# Patient Record
Sex: FEMALE | Race: WHITE | NOT HISPANIC OR LATINO | Employment: OTHER | URBAN - METROPOLITAN AREA
[De-identification: names, ages, dates, MRNs, and addresses within clinical notes are randomized per-mention and may not be internally consistent; named-entity substitution may affect disease eponyms.]

---

## 2017-01-10 ENCOUNTER — OFFICE VISIT (OUTPATIENT)
Dept: NEUROLOGY | Facility: CLINIC | Age: 77
End: 2017-01-10
Payer: MEDICARE

## 2017-01-10 VITALS
HEART RATE: 74 BPM | HEIGHT: 63 IN | SYSTOLIC BLOOD PRESSURE: 154 MMHG | BODY MASS INDEX: 21.79 KG/M2 | DIASTOLIC BLOOD PRESSURE: 74 MMHG | WEIGHT: 123 LBS

## 2017-01-10 DIAGNOSIS — G20.A1 PARKINSON DISEASE: ICD-10-CM

## 2017-01-10 DIAGNOSIS — G31.84 MCI (MILD COGNITIVE IMPAIRMENT): Primary | ICD-10-CM

## 2017-01-10 PROCEDURE — 99213 OFFICE O/P EST LOW 20 MIN: CPT | Mod: PBBFAC | Performed by: PSYCHIATRY & NEUROLOGY

## 2017-01-10 PROCEDURE — 99213 OFFICE O/P EST LOW 20 MIN: CPT | Mod: S$PBB,GC,, | Performed by: PSYCHIATRY & NEUROLOGY

## 2017-01-10 PROCEDURE — 99999 PR PBB SHADOW E&M-EST. PATIENT-LVL III: CPT | Mod: PBBFAC,GC,, | Performed by: PSYCHIATRY & NEUROLOGY

## 2017-01-10 RX ORDER — CARBIDOPA, LEVODOPA AND ENTACAPONE 25; 200; 100 MG/1; MG/1; MG/1
1 TABLET, FILM COATED ORAL
COMMUNITY
End: 2017-09-12

## 2017-01-10 RX ORDER — OMEPRAZOLE 20 MG/1
CAPSULE, DELAYED RELEASE ORAL
COMMUNITY
Start: 2016-12-07 | End: 2017-01-10 | Stop reason: ALTCHOICE

## 2017-01-10 RX ORDER — RASAGILINE MESYLATE 1 MG/1
TABLET ORAL
COMMUNITY
Start: 2016-10-10 | End: 2017-09-12

## 2017-01-10 RX ORDER — CHOLECALCIFEROL (VITAMIN D3) 25 MCG
500 TABLET,CHEWABLE ORAL
COMMUNITY

## 2017-01-10 RX ORDER — MEMANTINE HYDROCHLORIDE 10 MG/1
10 TABLET ORAL 2 TIMES DAILY
COMMUNITY
End: 2020-01-06 | Stop reason: SDUPTHER

## 2017-01-10 RX ORDER — IPRATROPIUM BROMIDE 21 UG/1
2 SPRAY, METERED NASAL
COMMUNITY
Start: 2016-08-22 | End: 2017-08-22

## 2017-01-10 RX ORDER — CLONAZEPAM 0.5 MG/1
0.5 TABLET ORAL
COMMUNITY
End: 2019-04-23 | Stop reason: SDUPTHER

## 2017-01-10 NOTE — PROGRESS NOTES
Patient Name: Vanesa Rae  MRN: [id number]    Neurologist:  Lora Olea ([place])    CC: Parkinson evaluation for DBS      Interval history:  Overall reported improvement on Stalevo. Still complaining of nausea when woke up in the morning which resolve after taking morning dose of Stalevo at 9 am.  No other acute events reported.     HPI: Vanesa Rae is a 76 y.o. female presented to my clinic for the evaluation of DBS. In 2007 she started having tremors in her right hand. It was a resting tremor, she went to Dr Lambert (Neurologist) diagnosed with parkinson disease. Started on Mirapex, Nupro patch and other medication that she do not remember. Reported no significant improvement and changed his doctor 2008 (Dr Taylor). She started her on sinemet  and helped her tremors. He started getting nauseated with it and decreases the dose. In 2010 she was referred to [facility] for DBS evaluation and they said she is not a candidate for DBS. She stayed with Dr Taylor for years. She did fairly well till 2015. In 2015 tremors gradually getting worse despite on medication. Started having trouble in driving. In 2016 progressed and get worse and worse. Lately she is walking faster and get of balance but never had a fall. Also complaining of increase urinary frequency and urgency for last 5-6 years. Since you were started on klonopin and nemanda she become constipated and takes miralex. Her  says she screem at night and mumbles. Denies aggression. Sometime she shakes bad and sometime no shaking. She shakes for 10-15 minutes after taking sinemet and than goes away and this happened many times. She shakes more when anxious. She had orthostatic hypotension episodes in this summer. She was prescribed with fludrocortison but she didn't take it.  She reported low BP at home. that  this She is here for DBS evaluation.  Also reported weave, dance a couple times per week. Denies HA, slurring of speech, difficulty in  swallowing, acute vision changes, focal weakness, numbness, change in smell, seizure, visual or auditory hallucinations or LOC.        Home medication:   -- Sinemet  4 times a day. Every 4 hours in day but not every 4 hours at night.     -- Nemanda 10 mg TWICE DAILY  -- Azilect 1 mg daily   -- Klonopin 0.5 mg  HS     ROS:    Constitutional: Negative for malaise/fatigue. Negative for weight loss.   HENT: Negative for hearing loss.   Eyes: Negative for blurred vision and double vision.   Respiratory: Negative for shortness of breath and stridor.   Cardiovascular: Negative for chest pain and palpitations.   Gastrointestinal: Negative for nausea, vomiting and constipation.   Genitourinary: Negative for frequency. Negative for urgency.   Musculoskeletal: Negative for joint pain. Negative for myalgias and falls.   Skin: Negative for rash.   Neurological: Negative for dizziness and tremors. Negative for focal weakness and seizures.   Endo/Heme/Allergies: Does not bruise/bleed easily.   Psychiatric/Behavioral: Negative for memory loss. Negative for depression and hallucinations.    Past Medical History  History reviewed. No pertinent past medical history.    Medications    Current Outpatient Prescriptions:     AZILECT 1 mg Tab, , Disp: , Rfl:     carbidopa-levodopa-entacapone -200 mg (STALEVO 100) -200 mg Tab, Take 1 tablet by mouth., Disp: , Rfl:     carbidopa-levodopa-entacapone -200 mg (STALEVO) -200 mg Tab, Take 1 tablet by mouth 4 (four) times daily., Disp: 120 tablet, Rfl: 11    clonazePAM (KLONOPIN) 0.5 MG tablet, Take 0.5 mg by mouth., Disp: , Rfl:     cyanocobalamin, vitamin B-12, 2,500 mcg Tab, Take 500 mcg by mouth., Disp: , Rfl:     ipratropium (ATROVENT) 0.03 % nasal spray, 2 sprays by Nasal route., Disp: , Rfl:     memantine (NAMENDA) 10 MG Tab, Take 10 mg by mouth., Disp: , Rfl:     omeprazole (PRILOSEC) 20 MG capsule, TAKE 2 CAPSULES BY MOUTH DAILY., Disp: , Rfl:  "    Allergies  Review of patient's allergies indicates:  No Known Allergies    Social History  Social History     Social History    Marital status:      Spouse name: N/A    Number of children: N/A    Years of education: N/A     Occupational History    Not on file.     Social History Main Topics    Smoking status: Never Smoker    Smokeless tobacco: Not on file    Alcohol use No    Drug use: No    Sexual activity: Not on file     Other Topics Concern    Not on file     Social History Narrative    No narrative on file       Family History  History reviewed. No pertinent family history.    Physical Exam  Visit Vitals    BP (!) 154/74 (BP Location: Left arm, Patient Position: Sitting, BP Method: Automatic)    Pulse 74    Ht 5' 3" (1.6 m)    Wt 55.8 kg (123 lb)    BMI 21.79 kg/m2       General appearance: Well-developed, well-groomed.     Neurologic Exam: The patient is awake, alert and oriented. Language is fluent. Recent and remote memory are normal. Attention span and concentration are normal. Fund of knowledge is appropriate.     Cranial nerves: pupils are round and reactive to light and accommodation. Visual fields are full to confrontation. Fundoscopic exam reveals sharp discs bilaterally, with good venous pulsations. Ocular motility is full in all cardinal positions of gaze. Facial sensation is normal to pinprick and light touch. Corneal reflexes are present bilaterally. Facial activation is symmetric. Hearing is normal bilaterally. Palate elevates symmetrically and gag reflex is intact bilaterally. Shoulder elevation is symmetric and full strength bilaterally. Tongue is midline and neck rotation strength is normal bilaterally. Neck range of motion is normal.     Motor examination of all extremities demonstrates normal bulk in all four limbs. There are no atrophy or fasciculations. Strength is 5/5 in the upper and lower extremities bilaterally without pronator drift. Mild resting tremors on " right side.   Sensory examination is normal to pinprick, vibration and proprioception in the upper and lower extremities bilaterally. Romberg is negative.    Deep tendon reflexes are 3+ and symmetric in the upper and lower extremities bilaterally. Toes are mute bilaterally.     Gait: Normal gait.    Coordination: Finger to nose and heel to shin testing is normal in both upper and lower extremities. Rapid alternating movements are normal in both upper and lower extremities.     General exam  Cardiovascular: regular rate and rhythm with no murmurs, rubs or gallops. There are no carotid or vertebral artery bruits. Pulses in both upper and lower extremities are symmetric. There is no peripheral edema.   Head and neck: no cervical lymphadenopathy      Lab and Test Results    No results found for: WBC, HGB, HCT, PLT  No results found for: GLU, NA, K, CL, CO2, BUN, CREATININE, CALCIUM, MG, PHOS  No results found for: ALB, ALKPHOS, ALT, AST      Images:    No recent imaging     Other Tests    Assessment     Vanesa Rae is a 76 y.o. 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 She is a candidate based on phenotype but goal of driving is unattainable and some other red flags like MCI and OH.   6) Driving safety       Plan    -- Continue Stalevo 225 -100-200 mg four time a day.   -- Will discontinue her omeprazole for one week and see if nausea improves. If not will add another dose of Stalevo at 5 am.   -- Continue Nemanda 10 mg   -- Will exhaust our medication options first before considering DBS candidacy   -- Follow up on June 37 th at  3 pm.   -- Case discussed with Dr Rula Zamorano MD  Neurology Resident    [facility]  [address] [place] [address]  Pager: [phone]

## 2017-01-10 NOTE — PATIENT INSTRUCTIONS
-- Discontinue omeprazole   -- Will consider adding another dose of Stalevo at 5 am if nausea stays after discontinuing omeprazole

## 2017-01-10 NOTE — MR AVS SNAPSHOT
[facility] - Neurology  [address]  [facility] [address]  Phone: [phone]  Fax: [phone]                  Vanesa Rae   1/10/2017 3:30 PM   Office Visit    Description:  Female : 1940   Provider:  Jaun Yip II, MD   Department:  [facility] - Neurology           Diagnoses this Visit        Comments    Parkinson disease                To Do List           Goals (5 Years of Data)     None      [facility]     [facility] On Call Nurse Care Line -  Assistance  Registered nurses in the [facility] On Call Center provide clinical advisement, health education, appointment booking, and other advisory services.  Call for this free service at [phone].             Medications           Message regarding Medications     Verify the changes and/or additions to your medication regime listed below are the same as discussed with your clinician today.  If any of these changes or additions are incorrect, please notify your healthcare provider.        STOP taking these medications     omeprazole (PRILOSEC) 20 MG capsule TAKE 2 CAPSULES BY MOUTH DAILY.           Verify that the below list of medications is an accurate representation of the medications you are currently taking.  If none reported, the list may be blank. If incorrect, please contact your healthcare provider. Carry this list with you in case of emergency.           Current Medications     AZILECT 1 mg Tab     carbidopa-levodopa-entacapone -200 mg (STALEVO 100) -200 mg Tab Take 1 tablet by mouth.    carbidopa-levodopa-entacapone -200 mg (STALEVO) -200 mg Tab Take 1 tablet by mouth 4 (four) times daily.    clonazePAM (KLONOPIN) 0.5 MG tablet Take 0.5 mg by mouth.    cyanocobalamin, vitamin B-12, 2,500 mcg Tab Take 500 mcg by mouth.    ipratropium (ATROVENT) 0.03 % nasal spray 2 sprays by Nasal route.    memantine (NAMENDA) 10 MG Tab Take 10 mg by mouth.           Clinical Reference Information           Vital  "Signs - Last Recorded  Most recent update: 1/10/2017  3:52 PM by Tea Olivera MA    BP Pulse Ht Wt BMI    (!) 154/74 (BP Location: Left arm, Patient Position: Sitting, BP Method: Automatic) 74 5' 3" (1.6 m) 55.8 kg (123 lb) 21.79 kg/m2      Blood Pressure          Most Recent Value    BP  (!)  154/74      Allergies as of 1/10/2017     No Known Allergies      Immunizations Administered on Date of Encounter - 1/10/2017     None      [facility] Sign-Up     Activating your [facility] account is as easy as 1-2-3!     1) Visit [email or web address], select Sign Up Now, enter this activation code and your date of birth, then select Next.  [email or web address]  Expires: 1/21/2017  1:01 PM      2) Create a username and password to use when you visit [facility] in the future and select a security question in case you lose your password and select Next.    3) Enter your e-mail address and click Sign Up!    Additional Information  If you have questions, please e-mail [email or web address] or call [phone] to talk to our [facility] staff. Remember, [facility] is NOT to be used for urgent needs. For medical emergencies, dial 911.         Instructions    -- Discontinue omeprazole   -- Will consider adding another dose of Stalevo at 5 am if nausea stays after discontinuing omeprazole         "

## 2017-02-14 ENCOUNTER — TELEPHONE (OUTPATIENT)
Dept: PEDIATRIC NEUROLOGY | Facility: CLINIC | Age: 77
End: 2017-02-14

## 2017-02-14 NOTE — TELEPHONE ENCOUNTER
Called multiple times. Left voice message. Wanted to know about her nausea. Will call again.       Pih Zamorano MD  Neurology Resident   [facility] [address] [place] [address]  Pager: [phone]

## 2017-03-27 ENCOUNTER — TELEPHONE (OUTPATIENT)
Dept: NEUROLOGY | Facility: CLINIC | Age: 77
End: 2017-03-27

## 2017-03-27 NOTE — TELEPHONE ENCOUNTER
----- Message from Jesusita Gil sent at 3/27/2017  2:27 PM CDT -----  Contact: Patient [phone]  Patient is calling to speak with Dr. Yip about her low blood pressure, patient says she has fainted a couple of times and she would like to know is she should get on medication. Please call

## 2017-05-10 ENCOUNTER — OFFICE VISIT (OUTPATIENT)
Dept: NEUROLOGY | Facility: CLINIC | Age: 77
End: 2017-05-10
Payer: MEDICARE

## 2017-05-10 VITALS
HEIGHT: 63 IN | HEART RATE: 83 BPM | BODY MASS INDEX: 19.57 KG/M2 | DIASTOLIC BLOOD PRESSURE: 70 MMHG | WEIGHT: 110.44 LBS | SYSTOLIC BLOOD PRESSURE: 115 MMHG

## 2017-05-10 DIAGNOSIS — G20.A1 PARKINSON DISEASE: Primary | ICD-10-CM

## 2017-05-10 PROCEDURE — 99213 OFFICE O/P EST LOW 20 MIN: CPT | Mod: PBBFAC | Performed by: PSYCHIATRY & NEUROLOGY

## 2017-05-10 PROCEDURE — 99999 PR PBB SHADOW E&M-EST. PATIENT-LVL III: CPT | Mod: PBBFAC,GC,, | Performed by: PSYCHIATRY & NEUROLOGY

## 2017-05-10 PROCEDURE — 99213 OFFICE O/P EST LOW 20 MIN: CPT | Mod: S$PBB,GC,, | Performed by: PSYCHIATRY & NEUROLOGY

## 2017-05-10 NOTE — PATIENT INSTRUCTIONS
Call Dr Norton who prescribed Northera and discuss if Azilect is causing low BP and she wants to stop Azilect and Northera to see if that is causing low BP.

## 2017-05-10 NOTE — MR AVS SNAPSHOT
[facility] - Neurology  [address]  [facility] [address]  Phone: [phone]  Fax: [phone]                  Vanesa Rae   5/10/2017 10:30 AM   Office Visit    Description:  Female : 1940   Provider:  Jaun Yip II, MD   Department:  [facility] - Neurology           Reason for Visit     Follow-up                To Do List           Goals (5 Years of Data)     None      [facility]     [facility] On Call Nurse Care Line -  Assistance  Unless otherwise directed by your provider, please contact [facility], our nurse care line that is available for  assistance.     Registered nurses in the [facility] Center provide: appointment scheduling, clinical advisement, health education, and other advisory services.  Call: [phone] (toll free)               Medications           Message regarding Medications     Verify the changes and/or additions to your medication regime listed below are the same as discussed with your clinician today.  If any of these changes or additions are incorrect, please notify your healthcare provider.             Verify that the below list of medications is an accurate representation of the medications you are currently taking.  If none reported, the list may be blank. If incorrect, please contact your healthcare provider. Carry this list with you in case of emergency.           Current Medications     AZILECT 1 mg Tab     carbidopa-levodopa-entacapone -200 mg (STALEVO 100) -200 mg Tab Take 1 tablet by mouth.    carbidopa-levodopa-entacapone -200 mg (STALEVO) -200 mg Tab Take 1 tablet by mouth 4 (four) times daily.    clonazePAM (KLONOPIN) 0.5 MG tablet Take 0.5 mg by mouth.    cyanocobalamin, vitamin B-12, 2,500 mcg Tab Take 500 mcg by mouth.    ipratropium (ATROVENT) 0.03 % nasal spray 2 sprays by Nasal route.    memantine (NAMENDA) 10 MG Tab Take 10 mg by mouth.           Clinical Reference Information           Your  "Vitals Were     BP Pulse Height Weight BMI    115/70 83 5' 3" (1.6 m) 50.1 kg (110 lb 7.2 oz) 19.57 kg/m2      Blood Pressure          Most Recent Value    BP  115/70      Allergies as of 5/10/2017     No Known Allergies      Immunizations Administered on Date of Encounter - 5/10/2017     None      Instructions    Call Dr Norton who prescribed Northera and discuss if Azilect is causing low BP and she wants to stop Azilect and Northera to see if that is causing low BP.          Language Assistance Services     ATTENTION: Language assistance services are available, free of charge. Please call [phone].      ATENCIÓN: Si habla español, tiene a garcia disposición servicios gratuitos de asistencia lingüística. Llame al [phone].     ALYCIA Ý: N?u b?n nói Ti?ng Vi?t, có các d?ch v? h? tr? ngôn ng? mi?n phí dành cho b?n. G?i s? [phone].         [facility] complies with applicable Federal civil rights laws and does not discriminate on the basis of race, color, national origin, age, disability, or sex.        "

## 2017-05-10 NOTE — PROGRESS NOTES
Patient Name: Vanesa Rae  MRN: [id number]    Neurologist:  Lora Olea ([place])    CC: Parkinson evaluation for DBS      Interval history:(5/10/2017)  Overall reported improvement on Stalevo. Nausea is improved 80%, now experiencing occasionally. Resting tremors resolved. She had tremors sometime when little nervous.       Interval history:(1/12/2017)  Overall reported improvement on Stalevo. Still complaining of nausea when woke up in the morning which resolve after taking morning dose of Stalevo at 9 am.  No other acute events reported.     HPI: Vanesa Rae is a 77 y.o. female presented to my clinic for the evaluation of DBS. In 2007 she started having tremors in her right hand. It was a resting tremor, she went to Dr Lambert (Neurologist) diagnosed with parkinson disease. Started on Mirapex, Nupro patch and other medication that she do not remember. Reported no significant improvement and changed his doctor 2008 (Dr Taylor). She started her on sinemet  and helped her tremors. He started getting nauseated with it and decreases the dose. In 2010 she was referred to [facility] for DBS evaluation and they said she is not a candidate for DBS. She stayed with Dr Taylor for years. She did fairly well till 2015. In 2015 tremors gradually getting worse despite on medication. Started having trouble in driving. In 2016 progressed and get worse and worse. Lately she is walking faster and get of balance but never had a fall. Also complaining of increase urinary frequency and urgency for last 5-6 years. Since you were started on klonopin and nemanda she become constipated and takes miralex. Her  says she screem at night and mumbles. Denies aggression. Sometime she shakes bad and sometime no shaking. She shakes for 10-15 minutes after taking sinemet and than goes away and this happened many times. She shakes more when anxious. She had orthostatic hypotension episodes in this summer. She was prescribed with  fludrocortison but she didn't take it.  She reported low BP at home. that  this She is here for DBS evaluation.  Also reported weave, dance a couple times per week. Denies HA, slurring of speech, difficulty in swallowing, acute vision changes, focal weakness, numbness, change in smell, seizure, visual or auditory hallucinations or LOC.        Home medication:   -- Sinemet  4 times a day. Every 4 hours in day but not every 4 hours at night.     -- Nemanda 10 mg TWICE DAILY  -- Azilect 1 mg daily   -- Klonopin 0.5 mg  HS     ROS:    Constitutional: Negative for malaise/fatigue. Negative for weight loss.   HENT: Negative for hearing loss.   Eyes: Negative for blurred vision and double vision.   Respiratory: Negative for shortness of breath and stridor.   Cardiovascular: Negative for chest pain and palpitations.   Gastrointestinal: Negative for nausea, vomiting and constipation.   Genitourinary: Negative for frequency. Negative for urgency.   Musculoskeletal: Negative for joint pain. Negative for myalgias and falls.   Skin: Negative for rash.   Neurological: Negative for dizziness and tremors. Negative for focal weakness and seizures.   Endo/Heme/Allergies: Does not bruise/bleed easily.   Psychiatric/Behavioral: Negative for memory loss. Negative for depression and hallucinations.    Past Medical History  No past medical history on file.    Medications    Current Outpatient Prescriptions:     AZILECT 1 mg Tab, , Disp: , Rfl:     carbidopa-levodopa-entacapone -200 mg (STALEVO 100) -200 mg Tab, Take 1 tablet by mouth., Disp: , Rfl:     carbidopa-levodopa-entacapone -200 mg (STALEVO) -200 mg Tab, Take 1 tablet by mouth 4 (four) times daily., Disp: 120 tablet, Rfl: 11    clonazePAM (KLONOPIN) 0.5 MG tablet, Take 0.5 mg by mouth., Disp: , Rfl:     cyanocobalamin, vitamin B-12, 2,500 mcg Tab, Take 500 mcg by mouth., Disp: , Rfl:     ipratropium (ATROVENT) 0.03 % nasal spray, 2 sprays by  "Nasal route., Disp: , Rfl:     memantine (NAMENDA) 10 MG Tab, Take 10 mg by mouth., Disp: , Rfl:     Allergies  Review of patient's allergies indicates:  No Known Allergies    Social History  Social History     Social History    Marital status:      Spouse name: N/A    Number of children: N/A    Years of education: N/A     Occupational History    Not on file.     Social History Main Topics    Smoking status: Never Smoker    Smokeless tobacco: Not on file    Alcohol use No    Drug use: No    Sexual activity: Not on file     Other Topics Concern    Not on file     Social History Narrative       Family History  No family history on file.    Physical Exam  /70  Pulse 83  Ht 5' 3" (1.6 m)  Wt 50.1 kg (110 lb 7.2 oz)  BMI 19.57 kg/m2    General appearance: Well-developed, well-groomed.     Neurologic Exam: The patient is awake, alert and oriented. Language is fluent. Recent and remote memory are normal. Attention span and concentration are normal. Fund of knowledge is appropriate.     Cranial nerves: pupils are round and reactive to light and accommodation. Visual fields are full to confrontation. Fundoscopic exam reveals sharp discs bilaterally, with good venous pulsations. Ocular motility is full in all cardinal positions of gaze. Facial sensation is normal to pinprick and light touch. Corneal reflexes are present bilaterally. Facial activation is symmetric. Hearing is normal bilaterally. Palate elevates symmetrically and gag reflex is intact bilaterally. Shoulder elevation is symmetric and full strength bilaterally. Tongue is midline and neck rotation strength is normal bilaterally. Neck range of motion is normal.     Motor examination of all extremities demonstrates normal bulk in all four limbs. There are no atrophy or fasciculations. Strength is 5/5 in the upper and lower extremities bilaterally without pronator drift. No resting tremors today.   Sensory examination is normal to pinprick, " vibration and proprioception in the upper and lower extremities bilaterally. Romberg is negative.    Deep tendon reflexes are 3+ and symmetric in the upper and lower extremities bilaterally. Toes are mute bilaterally.     Gait: Normal gait.    Coordination: Finger to nose and heel to shin testing is normal in both upper and lower extremities. Rapid alternating movements are normal in both upper and lower extremities.     General exam  Cardiovascular: regular rate and rhythm with no murmurs, rubs or gallops. There are no carotid or vertebral artery bruits. Pulses in both upper and lower extremities are symmetric. There is no peripheral edema.   Head and neck: no cervical lymphadenopathy      Lab and Test Results    No results found for: WBC, HGB, HCT, PLT  No results found for: GLU, NA, K, CL, CO2, BUN, CREATININE, CALCIUM, MG, PHOS  No results found for: ALB, ALKPHOS, ALT, AST      Images:    No recent imaging     Other Tests    Assessment     Vanesa Rae is a 77 y.o. female presented to my clinic initially for the evaluation of her parkinson disease and to evaluate if she is a DBS candidate. 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 She is a candidate based on phenotype but goal of driving is unattainable and some other red flags like MCI and [place].   6) Driving safety       Plan    -- Continue Stalevo 25 -100-200 mg four time a day.   -- Continue Nemanda 10 mg   -- She was started on Northera for low BP   -- Call Dr Norton who prescribed Northera and discuss if Azilect is causing low BP and she wants to stop Azilect and Northera to see if that is causing low BP.    -- Follow up in 4 months  -- Case discussed with Dr Oneal Zamorano MD  Neurology Resident   [facility]  [facility]  [address] [place] [address]  Pager: [phone]

## 2017-05-12 NOTE — PROGRESS NOTES
Pt doing well.  No falls.  Tremor well controlled.  BUE R>L rigidity.  Continue current PD regiment.      Tim No MD  Neurologist  [facility]       I have seen the patient, reviewed the  history and physical, assessment and plan. I have personally interviewed and examined the patient at bedside and: agree with the findings. The Resident and I have spent a total of more than 50% of a 25 minute visit in chart review, lab review, personal image review, patient centered care, coordination of care and seeing the patient.

## 2017-09-12 ENCOUNTER — OFFICE VISIT (OUTPATIENT)
Dept: NEUROLOGY | Facility: CLINIC | Age: 77
End: 2017-09-12
Payer: MEDICARE

## 2017-09-12 VITALS
WEIGHT: 110.25 LBS | SYSTOLIC BLOOD PRESSURE: 179 MMHG | BODY MASS INDEX: 19.54 KG/M2 | DIASTOLIC BLOOD PRESSURE: 85 MMHG | HEIGHT: 63 IN | HEART RATE: 70 BPM

## 2017-09-12 DIAGNOSIS — G20.A1 PARKINSON DISEASE: Primary | ICD-10-CM

## 2017-09-12 DIAGNOSIS — R25.1 TREMOR: ICD-10-CM

## 2017-09-12 PROCEDURE — 1125F AMNT PAIN NOTED PAIN PRSNT: CPT | Mod: GC,,, | Performed by: PSYCHIATRY & NEUROLOGY

## 2017-09-12 PROCEDURE — 1159F MED LIST DOCD IN RCRD: CPT | Mod: GC,,, | Performed by: PSYCHIATRY & NEUROLOGY

## 2017-09-12 PROCEDURE — 99999 PR PBB SHADOW E&M-EST. PATIENT-LVL III: CPT | Mod: PBBFAC,GC,, | Performed by: PSYCHIATRY & NEUROLOGY

## 2017-09-12 PROCEDURE — 99213 OFFICE O/P EST LOW 20 MIN: CPT | Mod: PBBFAC | Performed by: PSYCHIATRY & NEUROLOGY

## 2017-09-12 PROCEDURE — 99214 OFFICE O/P EST MOD 30 MIN: CPT | Mod: S$PBB,GC,, | Performed by: PSYCHIATRY & NEUROLOGY

## 2017-09-12 NOTE — PROGRESS NOTES
Patient Name: Vanesa Rae  MRN: [id number]    Neurologist:  Lora Olea ([place])    CC: Parkinson evaluation for DBS      Interval history:(9/12/2017)  Orthostatic hypotension symptoms resolved after stopping Azilect and now she does not required Northera for low BP. Her tremors start coming back after 3 hours of taking Stalevo which is one hour before next dose. Breakthrough tremors and dyskinesia is associated with stress. Denies HA, slurring of speech, difficulty in swallowing, acute vision changes, focal weakness, numbness, change in smell, seizure, visual or auditory hallucinations or LOC.      Interval history:(5/10/2017)  Overall reported improvement on Stalevo. Nausea is improved 80%, now experiencing occasionally. Resting tremors resolved. She had tremors sometime when little nervous.       Interval history:(1/12/2017)  Overall reported improvement on Stalevo. Still complaining of nausea when woke up in the morning which resolve after taking morning dose of Stalevo at 9 am.  No other acute events reported.     HPI: Vanesa Rae is a 77 y.o. female presented to my clinic for the evaluation of DBS. In 2007 she started having tremors in her right hand. It was a resting tremor, she went to Dr Lambert (Neurologist) diagnosed with parkinson disease. Started on Mirapex, Nupro patch and other medication that she do not remember. Reported no significant improvement and changed his doctor 2008 (Dr Taylor). She started her on sinemet  and helped her tremors. He started getting nauseated with it and decreases the dose. In 2010 she was referred to [facility] for DBS evaluation and they said she is not a candidate for DBS. She stayed with Dr Taylor for years. She did fairly well till 2015. In 2015 tremors gradually getting worse despite on medication. Started having trouble in driving. In 2016 progressed and get worse and worse. Lately she is walking faster and get of balance but never had a fall. Also  complaining of increase urinary frequency and urgency for last 5-6 years. Since you were started on klonopin and nemanda she become constipated and takes miralex. Her  says she screem at night and mumbles. Denies aggression. Sometime she shakes bad and sometime no shaking. She shakes for 10-15 minutes after taking sinemet and than goes away and this happened many times. She shakes more when anxious. She had orthostatic hypotension episodes in this summer. She was prescribed with fludrocortison but she didn't take it.  She reported low BP at home. that  this She is here for DBS evaluation.  Also reported weave, dance a couple times per week. Denies HA, slurring of speech, difficulty in swallowing, acute vision changes, focal weakness, numbness, change in smell, seizure, visual or auditory hallucinations or LOC.        Home medication:   -- Sinemet  4 times a day. Every 4 hours in day but not every 4 hours at night.     -- Nemanda 10 mg TWICE DAILY  -- Azilect 1 mg daily   -- Klonopin 0.5 mg  HS     ROS:    Constitutional: Negative for malaise/fatigue. Negative for weight loss.   HENT: Negative for hearing loss.   Eyes: Negative for blurred vision and double vision.   Respiratory: Negative for shortness of breath and stridor.   Cardiovascular: Negative for chest pain and palpitations.   Gastrointestinal: Negative for nausea, vomiting and constipation.   Genitourinary: Negative for frequency. Negative for urgency.   Musculoskeletal: Negative for joint pain. Negative for myalgias and falls.   Skin: Negative for rash.   Neurological: Negative for dizziness and tremors. Negative for focal weakness and seizures.   Endo/Heme/Allergies: Does not bruise/bleed easily.   Psychiatric/Behavioral: Negative for memory loss. Negative for depression and hallucinations.    Past Medical History  No past medical history on file.    Medications    Current Outpatient Prescriptions:     carbidopa-levodopa-entacapone  "-200 mg (STALEVO) -200 mg Tab, Take 1 tablet by mouth 4 (four) times daily., Disp: 120 tablet, Rfl: 11    clonazePAM (KLONOPIN) 0.5 MG tablet, Take 0.5 mg by mouth., Disp: , Rfl:     cyanocobalamin, vitamin B-12, 2,500 mcg Tab, Take 500 mcg by mouth., Disp: , Rfl:     memantine (NAMENDA) 10 MG Tab, Take 10 mg by mouth., Disp: , Rfl:     Allergies  Review of patient's allergies indicates:  No Known Allergies    Social History  Social History     Social History    Marital status:      Spouse name: N/A    Number of children: N/A    Years of education: N/A     Occupational History    Not on file.     Social History Main Topics    Smoking status: Never Smoker    Smokeless tobacco: Not on file    Alcohol use No    Drug use: No    Sexual activity: Not on file     Other Topics Concern    Not on file     Social History Narrative    No narrative on file       Family History  No family history on file.    Physical Exam  BP (!) 179/85   Pulse 70   Ht 5' 3" (1.6 m)   Wt 50 kg (110 lb 3.7 oz)   BMI 19.53 kg/m²     General appearance: Well-developed, well-groomed.     Neurologic Exam: The patient is awake, alert and oriented. Language is fluent. Recent and remote memory are normal. Attention span and concentration are normal. Fund of knowledge is appropriate.     Cranial nerves: pupils are round and reactive to light and accommodation. Visual fields are full to confrontation. Fundoscopic exam reveals sharp discs bilaterally, with good venous pulsations. Ocular motility is full in all cardinal positions of gaze. Facial sensation is normal to pinprick and light touch. Corneal reflexes are present bilaterally. Facial activation is symmetric. Hearing is normal bilaterally. Palate elevates symmetrically and gag reflex is intact bilaterally. Shoulder elevation is symmetric and full strength bilaterally. Tongue is midline and neck rotation strength is normal bilaterally. Neck range of motion is " normal.     Motor examination of all extremities demonstrates normal bulk in all four limbs. There are no atrophy or fasciculations. Strength is 5/5 in the upper and lower extremities bilaterally without pronator drift. No resting tremors today.   Sensory examination is normal to pinprick, vibration and proprioception in the upper and lower extremities bilaterally. Romberg is negative.    Deep tendon reflexes are 3+ and symmetric in the upper and lower extremities bilaterally. Toes are mute bilaterally.     Gait: Normal gait.    Coordination: Finger to nose and heel to shin testing is normal in both upper and lower extremities. Rapid alternating movements are normal in both upper and lower extremities.     General exam  Cardiovascular: regular rate and rhythm with no murmurs, rubs or gallops. There are no carotid or vertebral artery bruits. Pulses in both upper and lower extremities are symmetric. There is no peripheral edema.   Head and neck: no cervical lymphadenopathy      Lab and Test Results    No results found for: WBC, HGB, HCT, PLT  No results found for: GLU, NA, K, CL, CO2, BUN, CREATININE, CALCIUM, MG, PHOS  No results found for: ALB, ALKPHOS, ALT, AST      Images:    No recent imaging     Other Tests    Assessment     Vanesa Rae is a 77 y.o. female presented to my clinic initially for the evaluation of her parkinson disease and to evaluate if she is a DBS candidate. 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 Overall improvement    2) Orthostatic hypotension - Symptoms resolved   3) MCI  4) Dyskinesia secondary to levodopa 2-3 /week - resolved   5) DBS candidacy. She is a candidate based on phenotype but goal of driving is unattainable and some other red flags like MCI and [place].   6) Driving safety       Plan    -- Continue Stalevo 25 -100- 200 mg four time a day. Will change her timing to 9 am, 1 pm, 4 pm 8 pm  to control her tremors better. If no improvement will consider Stalevo 125.     -- Continue Nemanda 10 mg   -- Off Northera and Azilect   -- Case discussed with Dr Horta   -- Follow up with me on December 12th at 3: 30 PM.        Phi Zamorano MD  Neurology Resident   [facility]  [address] [place] [address]  Pager: [phone]

## 2017-09-13 PROBLEM — G20.A1 PARKINSON DISEASE: Status: ACTIVE | Noted: 2017-09-13

## 2017-11-15 ENCOUNTER — OFFICE VISIT (OUTPATIENT)
Dept: NEUROLOGY | Facility: CLINIC | Age: 77
End: 2017-11-15
Payer: MEDICARE

## 2017-11-15 VITALS
DIASTOLIC BLOOD PRESSURE: 73 MMHG | HEIGHT: 63 IN | BODY MASS INDEX: 19.49 KG/M2 | SYSTOLIC BLOOD PRESSURE: 134 MMHG | HEART RATE: 76 BPM | WEIGHT: 110 LBS

## 2017-11-15 DIAGNOSIS — F41.9 ANXIETY: ICD-10-CM

## 2017-11-15 DIAGNOSIS — G20.A1 PARKINSON DISEASE: Primary | ICD-10-CM

## 2017-11-15 PROCEDURE — 99213 OFFICE O/P EST LOW 20 MIN: CPT | Mod: PBBFAC | Performed by: PSYCHIATRY & NEUROLOGY

## 2017-11-15 PROCEDURE — 99999 PR PBB SHADOW E&M-EST. PATIENT-LVL III: CPT | Mod: PBBFAC,GC,, | Performed by: PSYCHIATRY & NEUROLOGY

## 2017-11-15 PROCEDURE — 99214 OFFICE O/P EST MOD 30 MIN: CPT | Mod: S$PBB,GC,, | Performed by: PSYCHIATRY & NEUROLOGY

## 2017-11-15 RX ORDER — CYCLOSPORINE 0.5 MG/ML
EMULSION OPHTHALMIC
COMMUNITY
Start: 2017-08-25

## 2017-11-15 RX ORDER — RASAGILINE 0.5 MG/1
0.5 TABLET ORAL DAILY
Qty: 30 TABLET | Refills: 3 | Status: SHIPPED | OUTPATIENT
Start: 2017-11-15 | End: 2018-02-07 | Stop reason: SDUPTHER

## 2017-11-15 RX ORDER — IPRATROPIUM BROMIDE 21 UG/1
2 SPRAY, METERED NASAL
COMMUNITY
Start: 2017-08-15 | End: 2018-08-15

## 2017-11-15 NOTE — PROGRESS NOTES
Patient Name: Vanesa Rae  MRN: [id number]    Neurologist:  Lora Olea ([place])    CC: Parkinson evaluation for DBS      Interval history:(11/15/2017)  Tremors getting worse. She reported that her tremors comming after 1 hour of taking her Stalevo. Resolved after 30 minutes. Denies HA, slurring of speech, difficulty in swallowing, acute vision changes, focal weakness, numbness, change in smell, seizure, visual or auditory hallucinations or LOC.        Interval history:(9/12/2017)  Orthostatic hypotension symptoms resolved after stopping Azilect and now she does not required Northera for low BP. Her tremors start coming back after 3 hours of taking Stalevo which is one hour before next dose. Breakthrough tremors and dyskinesia is associated with stress. Denies HA, slurring of speech, difficulty in swallowing, acute vision changes, focal weakness, numbness, change in smell, seizure, visual or auditory hallucinations or LOC.      Interval history:(5/10/2017)  Overall reported improvement on Stalevo. Nausea is improved 80%, now experiencing occasionally. Resting tremors resolved. She had tremors sometime when little nervous.       Interval history:(1/12/2017)  Overall reported improvement on Stalevo. Still complaining of nausea when woke up in the morning which resolve after taking morning dose of Stalevo at 9 am.  No other acute events reported.     HPI: Vanesa Rae is a 77 y.o. female presented to my clinic for the evaluation of DBS. In 2007 she started having tremors in her right hand. It was a resting tremor, she went to Dr Lambert (Neurologist) diagnosed with parkinson disease. Started on Mirapex, Nupro patch and other medication that she do not remember. Reported no significant improvement and changed his doctor 2008 (Dr Taylor). She started her on sinemet  and helped her tremors. He started getting nauseated with it and decreases the dose. In 2010 she was referred to [facility] for DBS evaluation and  they said she is not a candidate for DBS. She stayed with Dr Taylor for years. She did fairly well till 2015. In 2015 tremors gradually getting worse despite on medication. Started having trouble in driving. In 2016 progressed and get worse and worse. Lately she is walking faster and get of balance but never had a fall. Also complaining of increase urinary frequency and urgency for last 5-6 years. Since you were started on klonopin and nemanda she become constipated and takes miralex. Her  says she screem at night and mumbles. Denies aggression. Sometime she shakes bad and sometime no shaking. She shakes for 10-15 minutes after taking sinemet and than goes away and this happened many times. She shakes more when anxious. She had orthostatic hypotension episodes in this summer. She was prescribed with fludrocortison but she didn't take it.  She reported low BP at home. that  this She is here for DBS evaluation.  Also reported weave, dance a couple times per week. Denies HA, slurring of speech, difficulty in swallowing, acute vision changes, focal weakness, numbness, change in smell, seizure, visual or auditory hallucinations or LOC.        Previous Home medication:   -- Sinemet  4 times a day. Every 4 hours in day but not every 4 hours at night.     -- Nemanda 10 mg TWICE DAILY  -- Azilect 1 mg daily   -- Klonopin 0.5 mg  HS     ROS:    Constitutional: Negative for malaise/fatigue. Negative for weight loss.   HENT: Negative for hearing loss.   Eyes: Negative for blurred vision and double vision.   Respiratory: Negative for shortness of breath and stridor.   Cardiovascular: Negative for chest pain and palpitations.   Gastrointestinal: Negative for nausea, vomiting and constipation.   Genitourinary: Negative for frequency. Negative for urgency.   Musculoskeletal: Negative for joint pain. Negative for myalgias and falls.   Skin: Negative for rash.   Neurological: Negative for dizziness and tremors.  "Negative for focal weakness and seizures.   Endo/Heme/Allergies: Does not bruise/bleed easily.   Psychiatric/Behavioral: Negative for memory loss. Negative for depression and hallucinations.    Past Medical History  No past medical history on file.    Medications    Current Outpatient Prescriptions:     carbidopa-levodopa-entacapone -200 mg (STALEVO) -200 mg Tab, Take 1 tablet by mouth 4 (four) times daily., Disp: 120 tablet, Rfl: 11    clonazePAM (KLONOPIN) 0.5 MG tablet, Take 0.5 mg by mouth., Disp: , Rfl:     cyanocobalamin, vitamin B-12, 2,500 mcg Tab, Take 500 mcg by mouth., Disp: , Rfl:     ipratropium (ATROVENT) 0.03 % nasal spray, 2 sprays by Nasal route., Disp: , Rfl:     memantine (NAMENDA) 10 MG Tab, Take 10 mg by mouth 2 (two) times daily. , Disp: , Rfl:     RESTASIS 0.05 % ophthalmic emulsion, , Disp: , Rfl:     Allergies  Review of patient's allergies indicates:  No Known Allergies    Social History  Social History     Social History    Marital status:      Spouse name: N/A    Number of children: N/A    Years of education: N/A     Occupational History    Not on file.     Social History Main Topics    Smoking status: Never Smoker    Smokeless tobacco: Not on file    Alcohol use No    Drug use: No    Sexual activity: Not on file     Other Topics Concern    Not on file     Social History Narrative    No narrative on file       Family History  No family history on file.    Physical Exam  /73   Pulse 76   Ht 5' 3" (1.6 m)   Wt 49.9 kg (110 lb 0.2 oz)   BMI 19.49 kg/m²     General appearance: Well-developed, well-groomed.     Neurologic Exam: The patient is awake, alert and oriented. Language is fluent. Recent and remote memory are normal. Attention span and concentration are normal. Fund of knowledge is appropriate.     Cranial nerves: pupils are round and reactive to light and accommodation. Visual fields are full to confrontation. Fundoscopic exam reveals " sharp discs bilaterally, with good venous pulsations. Ocular motility is full in all cardinal positions of gaze. Facial sensation is normal to pinprick and light touch. Corneal reflexes are present bilaterally. Facial activation is symmetric. Hearing is normal bilaterally. Palate elevates symmetrically and gag reflex is intact bilaterally. Shoulder elevation is symmetric and full strength bilaterally. Tongue is midline and neck rotation strength is normal bilaterally. Neck range of motion is normal.     Motor examination of all extremities demonstrates normal bulk in all four limbs. There are no atrophy or fasciculations. Strength is 5/5 in the upper and lower extremities bilaterally without pronator drift. Resting tremor today R>L.     Sensory examination is normal to pinprick, vibration and proprioception in the upper and lower extremities bilaterally. Romberg is negative.    Deep tendon reflexes are 3+ and symmetric in the upper and lower extremities bilaterally. Toes are mute bilaterally.     Gait: Normal gait.    Coordination: Finger to nose and heel to shin testing is normal in both upper and lower extremities. Rapid alternating movements are normal in both upper and lower extremities.     General exam  Cardiovascular: regular rate and rhythm with no murmurs, rubs or gallops. There are no carotid or vertebral artery bruits. Pulses in both upper and lower extremities are symmetric. There is no peripheral edema.   Head and neck: no cervical lymphadenopathy      Lab and Test Results    No results found for: WBC, HGB, HCT, PLT  No results found for: GLU, NA, K, CL, CO2, BUN, CREATININE, CALCIUM, MG, PHOS  No results found for: ALB, ALKPHOS, ALT, AST      Images:    No recent imaging     Other Tests    Assessment     Vanesa Rae is a 77 y.o. female presented to my clinic initially for the follow up of her parkinson disease.    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 Little worsening of tremors    2)  Orthostatic hypotension - Symptoms resolved   3) MCI  4) Dyskinesia secondary to levodopa 2-3 /week - resolved   5) DBS candidacy. She is a candidate based on phenotype but goal of driving is unattainable and some other red flags like MCI and [place].   6) Driving safety       Plan    -- Continue Stalevo 25 -100- 200 mg four time a day, 9 am, 1 pm, 4 pm 8 pm.    -- Will start on Azilect 0.5 mg daily considering little worsening of tremors.   -- Continue Nemanda 10 mg   -- Off Northera   -- Case discussed with Dr Horta   -- Follow up with me on February 7 th at 1:30 PM       Phi Zamorano MD  Neurology Resident   [facility] [address] [place] [address]  Pager: [phone]

## 2018-02-07 ENCOUNTER — OFFICE VISIT (OUTPATIENT)
Dept: NEUROLOGY | Facility: CLINIC | Age: 78
End: 2018-02-07
Payer: MEDICARE

## 2018-02-07 VITALS — BODY MASS INDEX: 19.49 KG/M2 | HEIGHT: 63 IN | WEIGHT: 110 LBS

## 2018-02-07 DIAGNOSIS — G20.A1 PARKINSON DISEASE: ICD-10-CM

## 2018-02-07 PROCEDURE — 99999 PR PBB SHADOW E&M-EST. PATIENT-LVL II: CPT | Mod: PBBFAC,GC,, | Performed by: PSYCHIATRY & NEUROLOGY

## 2018-02-07 PROCEDURE — 99213 OFFICE O/P EST LOW 20 MIN: CPT | Mod: S$PBB,GC,, | Performed by: PSYCHIATRY & NEUROLOGY

## 2018-02-07 PROCEDURE — 99212 OFFICE O/P EST SF 10 MIN: CPT | Mod: PBBFAC | Performed by: PSYCHIATRY & NEUROLOGY

## 2018-02-07 PROCEDURE — 1159F MED LIST DOCD IN RCRD: CPT | Mod: GC,,, | Performed by: PSYCHIATRY & NEUROLOGY

## 2018-02-07 PROCEDURE — 1126F AMNT PAIN NOTED NONE PRSNT: CPT | Mod: GC,,, | Performed by: PSYCHIATRY & NEUROLOGY

## 2018-02-07 RX ORDER — CARBIDOPA, LEVODOPA AND ENTACAPONE 25; 200; 100 MG/1; MG/1; MG/1
1 TABLET, FILM COATED ORAL 4 TIMES DAILY
Qty: 360 TABLET | Refills: 3 | Status: SHIPPED | OUTPATIENT
Start: 2018-02-07 | End: 2018-12-03

## 2018-02-07 RX ORDER — RASAGILINE 0.5 MG/1
0.5 TABLET ORAL DAILY
Qty: 90 TABLET | Refills: 1 | Status: SHIPPED | OUTPATIENT
Start: 2018-02-07 | End: 2019-02-11 | Stop reason: SDUPTHER

## 2018-02-07 RX ORDER — CARBIDOPA, LEVODOPA AND ENTACAPONE 25; 200; 100 MG/1; MG/1; MG/1
1 TABLET, FILM COATED ORAL 4 TIMES DAILY
Qty: 120 TABLET | Refills: 11 | Status: SHIPPED | OUTPATIENT
Start: 2018-02-07 | End: 2018-02-07 | Stop reason: SDUPTHER

## 2018-02-07 RX ORDER — RASAGILINE 0.5 MG/1
0.5 TABLET ORAL DAILY
Qty: 30 TABLET | Refills: 5 | Status: SHIPPED | OUTPATIENT
Start: 2018-02-07 | End: 2018-02-07 | Stop reason: SDUPTHER

## 2018-02-07 NOTE — PROGRESS NOTES
Patient Name: Vanesa Rae  MRN: [id number]    Neurologist:  Lora Olea ([place])    CC: Parkinson evaluation for DBS      Interval history:(2/7/2018)  After the start of Azilect tremors much better. Only few breakthrough. Denies HA, slurring of speech, difficulty in swallowing, acute vision changes, focal weakness, numbness, change in smell, seizure, visual or auditory hallucinations or LOC.      Interval history:(11/15/2017)  Tremors getting worse. She reported that her tremors comming after 1 hour of taking her Stalevo. Resolved after 30 minutes. Denies HA, slurring of speech, difficulty in swallowing, acute vision changes, focal weakness, numbness, change in smell, seizure, visual or auditory hallucinations or LOC.        Interval history:(9/12/2017)  Orthostatic hypotension symptoms resolved after stopping Azilect and now she does not required Northera for low BP. Her tremors start coming back after 3 hours of taking Stalevo which is one hour before next dose. Breakthrough tremors and dyskinesia is associated with stress. Denies HA, slurring of speech, difficulty in swallowing, acute vision changes, focal weakness, numbness, change in smell, seizure, visual or auditory hallucinations or LOC.      Interval history:(5/10/2017)  Overall reported improvement on Stalevo. Nausea is improved 80%, now experiencing occasionally. Resting tremors resolved. She had tremors sometime when little nervous.       Interval history:(1/12/2017)  Overall reported improvement on Stalevo. Still complaining of nausea when woke up in the morning which resolve after taking morning dose of Stalevo at 9 am.  No other acute events reported.     HPI: Vanesa Rae is a 78 y.o. female presented to my clinic for the evaluation of DBS. In 2007 she started having tremors in her right hand. It was a resting tremor, she went to Dr Lambert (Neurologist) diagnosed with parkinson disease. Started on Mirapex, Nupro patch and other medication that she  do not remember. Reported no significant improvement and changed his doctor 2008 (Dr Taylor). She started her on sinemet  and helped her tremors. He started getting nauseated with it and decreases the dose. In 2010 she was referred to [place] for DBS evaluation and they said she is not a candidate for DBS. She stayed with Dr Tayolr for years. She did fairly well till 2015. In 2015 tremors gradually getting worse despite on medication. Started having trouble in driving. In 2016 progressed and get worse and worse. Lately she is walking faster and get of balance but never had a fall. Also complaining of increase urinary frequency and urgency for last 5-6 years. Since you were started on klonopin and nemanda she become constipated and takes miralex. Her  says she screem at night and mumbles. Denies aggression. Sometime she shakes bad and sometime no shaking. She shakes for 10-15 minutes after taking sinemet and than goes away and this happened many times. She shakes more when anxious. She had orthostatic hypotension episodes in this summer. She was prescribed with fludrocortison but she didn't take it.  She reported low BP at home. that  this She is here for DBS evaluation.  Also reported weave, dance a couple times per week. Denies HA, slurring of speech, difficulty in swallowing, acute vision changes, focal weakness, numbness, change in smell, seizure, visual or auditory hallucinations or LOC.        Previous Home medication:   -- Sinemet  4 times a day. Every 4 hours in day but not every 4 hours at night.     -- Nemanda 10 mg TWICE DAILY  -- Azilect 1 mg daily   -- Klonopin 0.5 mg  HS     ROS:    Constitutional: Negative for malaise/fatigue. Negative for weight loss.   HENT: Negative for hearing loss.   Eyes: Negative for blurred vision and double vision.   Respiratory: Negative for shortness of breath and stridor.   Cardiovascular: Negative for chest pain and palpitations.  "  Gastrointestinal: Negative for nausea, vomiting and constipation.   Genitourinary: Negative for frequency. Negative for urgency.   Musculoskeletal: Negative for joint pain. Negative for myalgias and falls.   Skin: Negative for rash.   Neurological: Negative for dizziness and tremors. Negative for focal weakness and seizures.   Endo/Heme/Allergies: Does not bruise/bleed easily.   Psychiatric/Behavioral: Negative for memory loss. Negative for depression and hallucinations.    Past Medical History  No past medical history on file.    Medications    Current Outpatient Prescriptions:     carbidopa-levodopa-entacapone -200 mg (STALEVO) -200 mg Tab, Take 1 tablet by mouth 4 (four) times daily., Disp: 120 tablet, Rfl: 11    clonazePAM (KLONOPIN) 0.5 MG tablet, Take 0.5 mg by mouth., Disp: , Rfl:     cyanocobalamin, vitamin B-12, 2,500 mcg Tab, Take 500 mcg by mouth., Disp: , Rfl:     ipratropium (ATROVENT) 0.03 % nasal spray, 2 sprays by Nasal route., Disp: , Rfl:     memantine (NAMENDA) 10 MG Tab, Take 10 mg by mouth 2 (two) times daily. , Disp: , Rfl:     RESTASIS 0.05 % ophthalmic emulsion, , Disp: , Rfl:     rasagiline (AZILECT) 0.5 MG Tab, Take 1 tablet (0.5 mg total) by mouth once daily., Disp: 30 tablet, Rfl: 3    Allergies  Review of patient's allergies indicates:  No Known Allergies    Social History  Social History     Social History    Marital status:      Spouse name: N/A    Number of children: N/A    Years of education: N/A     Occupational History    Not on file.     Social History Main Topics    Smoking status: Never Smoker    Smokeless tobacco: Not on file    Alcohol use No    Drug use: No    Sexual activity: Not on file     Other Topics Concern    Not on file     Social History Narrative    No narrative on file       Family History  No family history on file.    Physical Exam  Ht 5' 3" (1.6 m)   Wt 49.9 kg (110 lb 0.2 oz)   BMI 19.49 kg/m²     General appearance: " Well-developed, well-groomed.     Neurologic Exam: The patient is awake, alert and oriented. Language is fluent. Recent and remote memory are normal. Attention span and concentration are normal. Fund of knowledge is appropriate.     Cranial nerves: pupils are round and reactive to light and accommodation. Visual fields are full to confrontation. Fundoscopic exam reveals sharp discs bilaterally, with good venous pulsations. Ocular motility is full in all cardinal positions of gaze. Facial sensation is normal to pinprick and light touch. Corneal reflexes are present bilaterally. Facial activation is symmetric. Hearing is normal bilaterally. Palate elevates symmetrically and gag reflex is intact bilaterally. Shoulder elevation is symmetric and full strength bilaterally. Tongue is midline and neck rotation strength is normal bilaterally. Neck range of motion is normal.     Motor examination of all extremities demonstrates normal bulk in all four limbs. There are no atrophy or fasciculations. Strength is 5/5 in the upper and lower extremities bilaterally without pronator drift. Resting tremor today R>L.     Sensory examination is normal to pinprick, vibration and proprioception in the upper and lower extremities bilaterally. Romberg is negative.    Deep tendon reflexes are 3+ and symmetric in the upper and lower extremities bilaterally. Toes are mute bilaterally.     Gait: Normal gait.    Coordination: Finger to nose and heel to shin testing is normal in both upper and lower extremities. Rapid alternating movements are normal in both upper and lower extremities.     General exam  Cardiovascular: regular rate and rhythm with no murmurs, rubs or gallops. There are no carotid or vertebral artery bruits. Pulses in both upper and lower extremities are symmetric. There is no peripheral edema.   Head and neck: no cervical lymphadenopathy      Lab and Test Results    No results found for: WBC, HGB, HCT, PLT  No results found  for: GLU, NA, K, CL, CO2, BUN, CREATININE, CALCIUM, MG, PHOS  No results found for: ALB, ALKPHOS, ALT, AST      Images:    No recent imaging     Other Tests    Assessment     Vanesa Rae is a 78 y.o. female presented to my clinic initially for the follow up of her parkinson disease.    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 Improvement in tremors    2) Orthostatic hypotension - Symptoms resolved   3) MCI  4) Dyskinesia secondary to levodopa 2-3 /week - resolved   5) DBS candidacy. She is a candidate based on phenotype but goal of driving is unattainable and some other red flags like MCI and OH.   6) Driving safety       Plan    -- Continue Stalevo 25 -100- 200 mg four time a day, 9 am, 1 pm, 4 pm 8 pm.    -- Will start on Azilect 0.5 mg daily considering little worsening of tremors.   -- Continue Nemanda 10 mg   -- Off Northera   -- Case discussed with Dr Gentile   -- Follow up with me in 3 months in MD clinic      Phi Zamorano MD  Neurology Resident   [facility]  [address] [place] [id number]  Pager: [phone]

## 2018-05-22 ENCOUNTER — OFFICE VISIT (OUTPATIENT)
Dept: NEUROLOGY | Facility: CLINIC | Age: 78
End: 2018-05-22
Payer: MEDICARE

## 2018-05-22 VITALS
HEIGHT: 63 IN | WEIGHT: 108.69 LBS | DIASTOLIC BLOOD PRESSURE: 57 MMHG | BODY MASS INDEX: 19.26 KG/M2 | SYSTOLIC BLOOD PRESSURE: 96 MMHG | HEART RATE: 65 BPM

## 2018-05-22 DIAGNOSIS — G20.A1 PARKINSON DISEASE: Primary | ICD-10-CM

## 2018-05-22 PROCEDURE — 99214 OFFICE O/P EST MOD 30 MIN: CPT | Mod: S$PBB,GC,, | Performed by: PSYCHIATRY & NEUROLOGY

## 2018-05-22 PROCEDURE — 99213 OFFICE O/P EST LOW 20 MIN: CPT | Mod: PBBFAC | Performed by: PSYCHIATRY & NEUROLOGY

## 2018-05-22 PROCEDURE — 99999 PR PBB SHADOW E&M-EST. PATIENT-LVL III: CPT | Mod: PBBFAC,GC,, | Performed by: PSYCHIATRY & NEUROLOGY

## 2018-05-22 NOTE — PROGRESS NOTES
Patient Name: Vanesa Rae  MRN: [id number]    Neurologist:  Lora Olea ([place])    CC: Parkinson disease      Interval history:(5/22/2018)  Doing good overall. No active complains. Appetite is good. Nausea is better. Sleep is good. Occasionally tremors.        Interval history:(2/7/2018)  After the start of Azilect tremors much better. Only few breakthrough. Denies HA, slurring of speech, difficulty in swallowing, acute vision changes, focal weakness, numbness, change in smell, seizure, visual or auditory hallucinations or LOC.      Interval history:(11/15/2017)  Tremors getting worse. She reported that her tremors comming after 1 hour of taking her Stalevo. Resolved after 30 minutes. Denies HA, slurring of speech, difficulty in swallowing, acute vision changes, focal weakness, numbness, change in smell, seizure, visual or auditory hallucinations or LOC.        Interval history:(9/12/2017)  Orthostatic hypotension symptoms resolved after stopping Azilect and now she does not required Northera for low BP. Her tremors start coming back after 3 hours of taking Stalevo which is one hour before next dose. Breakthrough tremors and dyskinesia is associated with stress. Denies HA, slurring of speech, difficulty in swallowing, acute vision changes, focal weakness, numbness, change in smell, seizure, visual or auditory hallucinations or LOC.      Interval history:(5/10/2017)  Overall reported improvement on Stalevo. Nausea is improved 80%, now experiencing occasionally. Resting tremors resolved. She had tremors sometime when little nervous.       Interval history:(1/12/2017)  Overall reported improvement on Stalevo. Still complaining of nausea when woke up in the morning which resolve after taking morning dose of Stalevo at 9 am.  No other acute events reported.     HPI: Vanesa Rae is a 78 y.o. female presented to my clinic for the evaluation of DBS. In 2007 she started having tremors in her right hand. It was a resting  tremor, she went to Dr Lambert (Neurologist) diagnosed with parkinson disease. Started on Mirapex, Nupro patch and other medication that she do not remember. Reported no significant improvement and changed his doctor 2008 (Dr Taylor). She started her on sinemet  and helped her tremors. He started getting nauseated with it and decreases the dose. In 2010 she was referred to [place] for DBS evaluation and they said she is not a candidate for DBS. She stayed with Dr Taylor for years. She did fairly well till 2015. In 2015 tremors gradually getting worse despite on medication. Started having trouble in driving. In 2016 progressed and get worse and worse. Lately she is walking faster and get of balance but never had a fall. Also complaining of increase urinary frequency and urgency for last 5-6 years. Since you were started on klonopin and nemanda she become constipated and takes miralex. Her  says she screem at night and mumbles. Denies aggression. Sometime she shakes bad and sometime no shaking. She shakes for 10-15 minutes after taking sinemet and than goes away and this happened many times. She shakes more when anxious. She had orthostatic hypotension episodes in this summer. She was prescribed with fludrocortison but she didn't take it.  She reported low BP at home. that  this She is here for DBS evaluation.  Also reported weave, dance a couple times per week. Denies HA, slurring of speech, difficulty in swallowing, acute vision changes, focal weakness, numbness, change in smell, seizure, visual or auditory hallucinations or LOC.        Previous Home medication:   -- Sinemet  4 times a day. Every 4 hours in day but not every 4 hours at night.     -- Nemanda 10 mg TWICE DAILY  -- Azilect 1 mg daily   -- Klonopin 0.5 mg  HS     ROS:    Constitutional: Negative for malaise/fatigue. Negative for weight loss.   HENT: Negative for hearing loss.   Eyes: Negative for blurred vision and double vision.    Respiratory: Negative for shortness of breath and stridor.   Cardiovascular: Negative for chest pain and palpitations.   Gastrointestinal: Negative for nausea, vomiting and constipation.   Genitourinary: Negative for frequency. Negative for urgency.   Musculoskeletal: Negative for joint pain. Negative for myalgias and falls.   Skin: Negative for rash.   Neurological: Negative for dizziness and tremors. Negative for focal weakness and seizures.   Endo/Heme/Allergies: Does not bruise/bleed easily.   Psychiatric/Behavioral: Negative for memory loss. Negative for depression and hallucinations.    Past Medical History  History reviewed. No pertinent past medical history.    Medications    Current Outpatient Prescriptions:     carbidopa-levodopa-entacapone -200 mg (STALEVO) -200 mg Tab, Take 1 tablet by mouth 4 (four) times daily., Disp: 360 tablet, Rfl: 3    clonazePAM (KLONOPIN) 0.5 MG tablet, Take 0.5 mg by mouth., Disp: , Rfl:     cyanocobalamin, vitamin B-12, 2,500 mcg Tab, Take 500 mcg by mouth., Disp: , Rfl:     ipratropium (ATROVENT) 0.03 % nasal spray, 2 sprays by Nasal route., Disp: , Rfl:     memantine (NAMENDA) 10 MG Tab, Take 10 mg by mouth 2 (two) times daily. , Disp: , Rfl:     rasagiline (AZILECT) 0.5 MG Tab, Take 1 tablet (0.5 mg total) by mouth once daily., Disp: 90 tablet, Rfl: 1    RESTASIS 0.05 % ophthalmic emulsion, , Disp: , Rfl:     Allergies  Review of patient's allergies indicates:  No Known Allergies    Social History  Social History     Social History    Marital status:      Spouse name: N/A    Number of children: N/A    Years of education: N/A     Occupational History    Not on file.     Social History Main Topics    Smoking status: Never Smoker    Smokeless tobacco: Not on file    Alcohol use No    Drug use: No    Sexual activity: Not on file     Other Topics Concern    Not on file     Social History Narrative    No narrative on file       Family  "History  History reviewed. No pertinent family history.    Physical Exam  BP (!) 96/57   Pulse 65   Ht 5' 3" (1.6 m)   Wt 49.3 kg (108 lb 11 oz)   BMI 19.25 kg/m²     General appearance: Well-developed, well-groomed.     Neurologic Exam: The patient is awake, alert and oriented. Language is fluent. Recent and remote memory are normal. Attention span and concentration are normal. Fund of knowledge is appropriate.     Cranial nerves: pupils are round and reactive to light and accommodation. Visual fields are full to confrontation. Fundoscopic exam reveals sharp discs bilaterally, with good venous pulsations. Ocular motility is full in all cardinal positions of gaze. Facial sensation is normal to pinprick and light touch. Corneal reflexes are present bilaterally. Facial activation is symmetric. Hearing is normal bilaterally. Palate elevates symmetrically and gag reflex is intact bilaterally. Shoulder elevation is symmetric and full strength bilaterally. Tongue is midline and neck rotation strength is normal bilaterally. Neck range of motion is normal.     Motor examination of all extremities demonstrates normal bulk in all four limbs. There are no atrophy or fasciculations. Strength is 5/5 in the upper and lower extremities bilaterally without pronator drift. Resting tremor today R>L.     Sensory examination is normal to pinprick, vibration and proprioception in the upper and lower extremities bilaterally. Romberg is negative.    Deep tendon reflexes are 3+ and symmetric in the upper and lower extremities bilaterally. Toes are mute bilaterally.     Gait: Normal gait.    Coordination: Finger to nose and heel to shin testing is normal in both upper and lower extremities. Rapid alternating movements are normal in both upper and lower extremities.     General exam  Cardiovascular: regular rate and rhythm with no murmurs, rubs or gallops. There are no carotid or vertebral artery bruits. Pulses in both upper and lower " extremities are symmetric. There is no peripheral edema.   Head and neck: no cervical lymphadenopathy      Lab and Test Results    No results found for: WBC, HGB, HCT, PLT  No results found for: GLU, NA, K, CL, CO2, BUN, CREATININE, CALCIUM, MG, PHOS  No results found for: ALB, ALKPHOS, ALT, AST      Images:    No recent imaging     Other Tests    Assessment     Vanesa Rae is a 78 y.o. female presented to my clinic initially for the follow up of her parkinson disease.    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 Improvement in tremors    2) Orthostatic hypotension - Symptoms resolved   3) MCI  4) Dyskinesia secondary to levodopa 2-3 /week - I saw little dyskinesia today. She reported occasionally.   5) DBS candidacy. She is a candidate based on phenotype but goal of driving is unattainable and some other red flags like MCI and [place].   6) Driving safety       Plan  -- Continue Stalevo 25 -100- 200 mg four time a day, 9 am, 1 pm, 4 pm 8 pm.    -- Will start on Azilect 0.5 mg daily considering little worsening of tremors.   -- Continue Nemanda 10 mg   -- She reported that her dyskinesia is occasional and not bothering her. In future if she is interested will keep below options in mind  · Start Amantadine  · Decrease the dose of Stalevo to 75 and tweak the timings   -- Off Northera   -- Case discussed with Dr Branch   -- Follow up with me in 3 months in MD clinic with Dr Elizabeth. (I introduce Dr Elizabeth to the patient on this visit)      Phi Zamorano MD  Neurology Resident   [facility] [address] [place] [address]  Pager: [phone]

## 2018-05-30 NOTE — PROGRESS NOTES
Patient seen and examined.  I agree with the history, exam, assessment and plan within the resident's note.        Rufino Branch MD, MPH  Division of Movement and Memory Disorders  [facility]

## 2018-12-03 ENCOUNTER — OFFICE VISIT (OUTPATIENT)
Dept: NEUROLOGY | Facility: CLINIC | Age: 78
End: 2018-12-03
Payer: MEDICARE

## 2018-12-03 VITALS
DIASTOLIC BLOOD PRESSURE: 67 MMHG | WEIGHT: 105 LBS | SYSTOLIC BLOOD PRESSURE: 130 MMHG | HEART RATE: 62 BPM | BODY MASS INDEX: 18.61 KG/M2 | HEIGHT: 63 IN

## 2018-12-03 DIAGNOSIS — R41.3 MEMORY LOSS: ICD-10-CM

## 2018-12-03 DIAGNOSIS — G47.00 INSOMNIA, UNSPECIFIED TYPE: ICD-10-CM

## 2018-12-03 DIAGNOSIS — G20.A1 PARKINSON DISEASE: Primary | ICD-10-CM

## 2018-12-03 PROCEDURE — 99999 PR PBB SHADOW E&M-EST. PATIENT-LVL IV: CPT | Mod: PBBFAC,,, | Performed by: PSYCHIATRY & NEUROLOGY

## 2018-12-03 PROCEDURE — 99215 OFFICE O/P EST HI 40 MIN: CPT | Mod: S$PBB,,, | Performed by: PSYCHIATRY & NEUROLOGY

## 2018-12-03 PROCEDURE — 99214 OFFICE O/P EST MOD 30 MIN: CPT | Mod: PBBFAC | Performed by: PSYCHIATRY & NEUROLOGY

## 2018-12-03 RX ORDER — CARBIDOPA, LEVODOPA AND ENTACAPONE 25; 200; 100 MG/1; MG/1; MG/1
1 TABLET, FILM COATED ORAL
Qty: 150 TABLET | Refills: 11 | Status: SHIPPED | OUTPATIENT
Start: 2018-12-03 | End: 2019-02-11 | Stop reason: SDUPTHER

## 2018-12-03 RX ORDER — IPRATROPIUM BROMIDE 21 UG/1
SPRAY, METERED NASAL
COMMUNITY
Start: 2018-09-13

## 2018-12-03 NOTE — PROGRESS NOTES
MOVEMENT DISORDERS CLINIC NEW CONSULT NOTE    PCP/Referring Provider: Aaareferral Self  No address on file  Date of Service: 12/3/2018    Chief Complaint: PD care    HPI: Vanesa Rae is a R HANDED 78 y.o. female with a PMH significant for PD, diagnosed at age 68, who followed Dr. Yip, coming to establish care. At first she noticed a tremor tremor when writing on a chalk board and eating soup. Over time the tremor increased and is now present in 4 limbs R>L. This remains her most incapacitating issue. She hasn't noticed shuffling gait until very recently in the last year. She's noticed slight stiffness in her R shoulder for several years. Gait is fairly well preserved. She fell once due to syncope but otherwise 2 more falls total. Feels like her balance is not normal but fairly good. Easily walks several blocks despite not exercising much. No assist device. Walks around her garden for 20 minutes a day. She's noticed toe curling R>L in the last 2 months in the AM before her sinemet and sometimes at night.    In terms of orthostasis, this was a major issue for her for several years. She was having 4 syncopal spells on standing. This was determined to be medication-related. She feels like the azilect was causing the issue. At this point she no longer has orthostatic moments.    In terms of medications, tried Dopamine agonists, and then transitioned to Stavelo in the last several years.  She is currently on Stalevo 25/100/200 9am/1PM/4PM/8PM  She has intermittent OFFtime brett in the afternoon  Offtime in AM with offtime dystonia  ONtime is 3.5 hours typically    Mild dyskinesias which do not typically bother her  Takes clonopin at 8PM     No fam hx PD    PD Review of Symptoms:  Anosmia: None  Dysarthria/Hypophonia: Mild  Dysphagia/Sialorrhea: Mild dysphagia  Hallucinations: At times sees things out of the corner of her eye  Depression: Has irrational anxiety at times  Cognitive slowing: mild, on namenda  Impulsivity:  none  Obsessions/Compulsions:   -none  Urinary changes: frequency  Constipation: constipation- on miralax  Orthostasis: None current, past it was severe  Dyskinesia: Yes, see above  Falls: None  Freezing: None  Micrographia: Yes  Sleep issues:  -DEBBIE: None  -RBD: Yes   -Sleep Quality: Nocturia, insomia      Review of Systems:   Review of Systems   Constitutional: Negative for fever.   HENT: Negative for congestion.    Eyes: Negative for double vision.   Respiratory: Negative for cough and shortness of breath.    Cardiovascular: Negative for chest pain and leg swelling.   Gastrointestinal: Negative for nausea.   Genitourinary: Positive for frequency. Negative for dysuria.   Musculoskeletal: Positive for falls.   Skin: Negative for rash.   Neurological: Positive for tremors and speech change. Negative for headaches.   Psychiatric/Behavioral: Positive for memory loss. Negative for depression.       Neuroleptic exposure:  None    Current Medications:  Outpatient Encounter Medications as of 12/3/2018   Medication Sig Dispense Refill    clonazePAM (KLONOPIN) 0.5 MG tablet Take 0.5 mg by mouth.      cyanocobalamin, vitamin B-12, 2,500 mcg Tab Take 500 mcg by mouth.      ipratropium (ATROVENT) 0.03 % nasal spray INHALE 2 SPRAYS IN EACH NOSTRIL 4 TIMES DAILY.      memantine (NAMENDA) 10 MG Tab Take 10 mg by mouth 2 (two) times daily.       rasagiline (AZILECT) 0.5 MG Tab Take 1 tablet (0.5 mg total) by mouth once daily. 90 tablet 1    RESTASIS 0.05 % ophthalmic emulsion       [DISCONTINUED] carbidopa-levodopa-entacapone -200 mg (STALEVO) -200 mg Tab Take 1 tablet by mouth 4 (four) times daily. 360 tablet 3    carbidopa-levodopa-entacapone -200 mg (STALEVO 100) -200 mg Tab Take 1 tablet by mouth 5 (five) times daily. 150 tablet 11     No facility-administered encounter medications on file as of 12/3/2018.        Past Medical History:  No relevant PMHX    Past Surgical  "History:  Hysterectomy    Current Living Situation: lives with family -  has PD    Social:  Social History     Socioeconomic History    Marital status:      Spouse name: Not on file    Number of children: Not on file    Years of education: Not on file    Highest education level: Not on file   Social Needs    Financial resource strain: Not on file    Food insecurity - worry: Not on file    Food insecurity - inability: Not on file    Transportation needs - medical: Not on file    Transportation needs - non-medical: Not on file   Occupational History    Not on file   Tobacco Use    Smoking status: Never Smoker   Substance and Sexual Activity    Alcohol use: No    Drug use: No    Sexual activity: Not on file   Other Topics Concern    Not on file   Social History Narrative    Not on file       Family History:  See above    PHYSICAL:  /67   Pulse 62   Ht 5' 3" (1.6 m)   Wt 47.6 kg (105 lb)   BMI 18.60 kg/m²     General Medical Examination:  General: The patient is alert and cooperative with the exam.   HEENT: Normocephalic, atraumatic.   Neck: Supple.   Chest: Unlabored breathing.   CV: Symmetric pulses.   Ext: No clubbing, cyanosis, or edema.     Mental Status:  General: Good hygiene, appropriate appearance.  Mood/Affect: Appropriate/congruent.  Level of consciousness: Awake, alert.  Orientation: Oriented to person, place, time and situation.  Language: No Dysarthria  Remembers 1/3 at 5 mins    Cranial nerves:  I: Not tested  II: PERRL, VFF to counting  III, IV, VI: EOMI with conjugate gaze and no nystagmus on end gaze  V: Facial sensation intact and symmetric over the bilateral V1-V3  VII: Facial muscle activation intact and symmetric over the bilateral upper and lower face  VIII: Hearing intact in the b/l ears and symmetrical to finger rub  IX, X, XII: TUP midline  X: SCMs and shoulder shrug full strength b/l and symmetric    Motor:   Strength Intact throughout upper and lower " extremities, 5/5.  Fasciculations/Atrophy: None    Mild dyskinesias in shoulders  No resting tremor    DTRs:  ? Biceps Triceps Brachioradialis Knee Ankle   Left 2+ 2+ 2+ 2+ 0   Right 2+ 2+ 2+ 2+ 0       ? Finger taps Finger flicks HERMAN Heel taps   Left 1+ 1+ 1+ 1+   Right 1+ 0 1+ 1+   Neck tone: 1+  ? Arm Leg   Left 1+ 0   Right 1+ 0     Sensation:   -Light touch: Intact and symmetric in the bilateral upper and lower extremities.  -Temp: Intact and symmetric in the bilateral upper and lower extremities.  -Vibration: Intact and symmetric in the bilateral upper and lower extremities.  -Pin prick: Intact and symmetric in the bilateral upper and lower extremities.  -Proprioception: Intact and symmetric in the bilateral upper and lower extremities.    Coordination:   -Finger to nose: nl    Gait:  -Arises from chair without use of hands.  -Casual gait is: narrow based  -Turnin-step  No shuffle  Cannot tandem       Laboratory Data:  NA    Imaging:  NA    Assessment//Plan:   Problem List Items Addressed This Visit        Neuro    Parkinson disease - Primary    Current Assessment & Plan     Very mild tremor-predominate parkinsonism. Tremor much out of proportion to rigidity, which is trace. Today we talked extensively about ONtime and OFFtime. I showed her how to identify these symptoms and how to chart them. By description she has 3.5 hour ONtime with peak dose dyskinesias which are mild. As she is tolerating stalevo 25/100/200 4x daily will not change dose but change timing to reduce OFFtime. Will not treat dyskinesias at this time.         Relevant Orders    Ambulatory consult to Physical Therapy    Memory loss    Overview     Mild to moderate  Memory loss of the years consistent with PDD. Continues namenda.            Other    Insomnia    Current Assessment & Plan     Suggested trying melatonin 5mg rather than clonopin for insomnia.             Follow-up: 1 month  Discussed the importance of ongoing exercise in  efforts to improve mobility and balance.    Serina Best MD, MS  [facility]  Department of Neurology  Movement Disorders

## 2018-12-03 NOTE — ASSESSMENT & PLAN NOTE
Very mild tremor-predominate parkinsonism. Tremor much out of proportion to rigidity, which is trace. Today we talked extensively about ONtime and OFFtime. I showed her how to identify these symptoms and how to chart them. By description she has 3.5 hour ONtime with peak dose dyskinesias which are mild. As she is tolerating stalevo 25/100/200 4x daily will not change dose but change timing to reduce OFFtime. Will not treat dyskinesias at this time.

## 2018-12-03 NOTE — PATIENT INSTRUCTIONS
Take Stavelo at  7:30AM  11AM  2:30PM  6PM  11PM as needed    Physical therapy    Write journal    Write Pilltimes  Mealtimes     ON symptoms - No tremor, walk faster, dyskinesias (wiggling)   OFF symptoms - Toe curling, tremors, stiffness

## 2019-01-10 ENCOUNTER — OFFICE VISIT (OUTPATIENT)
Dept: NEUROLOGY | Facility: CLINIC | Age: 79
End: 2019-01-10
Payer: MEDICARE

## 2019-01-10 VITALS
HEART RATE: 68 BPM | WEIGHT: 108.25 LBS | HEIGHT: 63 IN | DIASTOLIC BLOOD PRESSURE: 65 MMHG | SYSTOLIC BLOOD PRESSURE: 117 MMHG | BODY MASS INDEX: 19.18 KG/M2

## 2019-01-10 DIAGNOSIS — G20.A1 PARKINSON DISEASE: ICD-10-CM

## 2019-01-10 DIAGNOSIS — F41.9 ANXIETY: ICD-10-CM

## 2019-01-10 DIAGNOSIS — G47.00 INSOMNIA, UNSPECIFIED TYPE: ICD-10-CM

## 2019-01-10 PROCEDURE — 99999 PR PBB SHADOW E&M-EST. PATIENT-LVL III: ICD-10-PCS | Mod: PBBFAC,,, | Performed by: PSYCHIATRY & NEUROLOGY

## 2019-01-10 PROCEDURE — 99215 PR OFFICE/OUTPT VISIT, EST, LEVL V, 40-54 MIN: ICD-10-PCS | Mod: S$PBB,,, | Performed by: PSYCHIATRY & NEUROLOGY

## 2019-01-10 PROCEDURE — 99213 OFFICE O/P EST LOW 20 MIN: CPT | Mod: PBBFAC | Performed by: PSYCHIATRY & NEUROLOGY

## 2019-01-10 PROCEDURE — 99999 PR PBB SHADOW E&M-EST. PATIENT-LVL III: CPT | Mod: PBBFAC,,, | Performed by: PSYCHIATRY & NEUROLOGY

## 2019-01-10 PROCEDURE — 99215 OFFICE O/P EST HI 40 MIN: CPT | Mod: S$PBB,,, | Performed by: PSYCHIATRY & NEUROLOGY

## 2019-01-10 RX ORDER — SERTRALINE HYDROCHLORIDE 25 MG/1
25 TABLET, FILM COATED ORAL DAILY
Qty: 30 TABLET | Refills: 11 | Status: SHIPPED | OUTPATIENT
Start: 2019-01-10 | End: 2019-02-11 | Stop reason: SDUPTHER

## 2019-01-10 NOTE — ASSESSMENT & PLAN NOTE
Tremor predominate PD with dyskinesias. Per her journal, dyskinesias and tremor seem to cluster around end of dose. This was confirmed by her exam today as she was wearing off. ONtime seems to be 3-3.4 hours. Suggested increasing pill frequency to curb these effects. Stavevo at 7:30AM/10:30AM/1PM/4PM/7PM. Continue Azilect 1mg QHS.

## 2019-01-10 NOTE — PROGRESS NOTES
"MOVEMENT DISORDERS CLINIC NEW CONSULT NOTE    PCP/Referring Provider: No referring provider defined for this encounter.  Date of Service: 1/10/2019    Chief Complaint: PD care    Interval Hx  Takes Stelevo 25/100/200 - 7:30AM, 11AM, 2:30AM, 6PM  No falls, does not use an assist device  Notices dyskinesias and tremors as dose is wearing off per the journal she wrote me  No dizziness, hallucinations, nausea     "Prior HPI: Vanesa Rae is a R HANDED 78 y.o. female with a PMH significant for PD, diagnosed at age 68, who followed Dr. Yip, coming to establish care. At first she noticed a tremor tremor when writing on a chalk board and eating soup. Over time the tremor increased and is now present in 4 limbs R>L. This remains her most incapacitating issue. She hasn't noticed shuffling gait until very recently in the last year. She's noticed slight stiffness in her R shoulder for several years. Gait is fairly well preserved. She fell once due to syncope but otherwise 2 more falls total. Feels like her balance is not normal but fairly good. Easily walks several blocks despite not exercising much. No assist device. Walks around her garden for 20 minutes a day. She's noticed toe curling R>L in the last 2 months in the AM before her sinemet and sometimes at night.    In terms of orthostasis, this was a major issue for her for several years. She was having 4 syncopal spells on standing. This was determined to be medication-related. She feels like the azilect was causing the issue. At this point she no longer has orthostatic moments.    In terms of medications, tried Dopamine agonists, and then transitioned to Stavelo in the last several years.  She is currently on Stalevo 25/100/200 9am/1PM/4PM/8PM  She has intermittent OFFtime brett in the afternoon  Offtime in AM with offtime dystonia  ONtime is 3.5 hours typically    Mild dyskinesias which do not typically bother her  Takes clonopin at 8PM     No fam hx PD"    PD Review of " "Symptoms:  Anosmia: None  Dysarthria/Hypophonia: Mild  Dysphagia/Sialorrhea: Mild dysphagia  Hallucinations: At times sees things out of the corner of her eye  Depression: Has irrational anxiety at times - continues to struggle with this - not on an antideprssant  Cognitive slowing: mild, on namenda  Impulsivity: none  Obsessions/Compulsions:   -none  Urinary changes: frequency  Constipation: constipation- on miralax  Orthostasis: None current, past it was severe  Dyskinesia: Yes, see above  Falls: None  Freezing: None  Micrographia: Yes  Sleep issues:  -DEBBIE: None  -RBD: Yes   -Sleep Quality: Nocturia, insomnia - sits in bed and cannot "turn off mind"      Review of Systems:   Review of Systems   Constitutional: Negative for fever.   HENT: Negative for congestion.    Eyes: Negative for double vision.   Respiratory: Negative for cough and shortness of breath.    Cardiovascular: Negative for chest pain and leg swelling.   Gastrointestinal: Negative for nausea.   Genitourinary: Positive for frequency. Negative for dysuria.   Musculoskeletal: Positive for falls.   Skin: Negative for rash.   Neurological: Positive for tremors and speech change. Negative for headaches.   Psychiatric/Behavioral: Positive for memory loss. Negative for depression.       Neuroleptic exposure:  None    Current Medications:  Outpatient Encounter Medications as of 1/10/2019   Medication Sig Dispense Refill    carbidopa-levodopa-entacapone -200 mg (STALEVO 100) -200 mg Tab Take 1 tablet by mouth 5 (five) times daily. 150 tablet 11    clonazePAM (KLONOPIN) 0.5 MG tablet Take 0.5 mg by mouth.      cyanocobalamin, vitamin B-12, 2,500 mcg Tab Take 500 mcg by mouth.      ipratropium (ATROVENT) 0.03 % nasal spray INHALE 2 SPRAYS IN EACH NOSTRIL 4 TIMES DAILY.      memantine (NAMENDA) 10 MG Tab Take 10 mg by mouth 2 (two) times daily.       rasagiline (AZILECT) 0.5 MG Tab Take 1 tablet (0.5 mg total) by mouth once daily. 90 tablet 1 " "   RESTASIS 0.05 % ophthalmic emulsion       sertraline (ZOLOFT) 25 MG tablet Take 1 tablet (25 mg total) by mouth once daily. 30 tablet 11     No facility-administered encounter medications on file as of 1/10/2019.        Past Medical History:  No relevant PMHX    Past Surgical History:  Hysterectomy    Current Living Situation: lives with family -  has PD    Social:  Social History     Socioeconomic History    Marital status:      Spouse name: Not on file    Number of children: Not on file    Years of education: Not on file    Highest education level: Not on file   Social Needs    Financial resource strain: Not on file    Food insecurity - worry: Not on file    Food insecurity - inability: Not on file    Transportation needs - medical: Not on file    Transportation needs - non-medical: Not on file   Occupational History    Not on file   Tobacco Use    Smoking status: Never Smoker   Substance and Sexual Activity    Alcohol use: No    Drug use: No    Sexual activity: Not on file   Other Topics Concern    Not on file   Social History Narrative    Not on file       Family History:  See above    PHYSICAL:  /65   Pulse 68   Ht 5' 3" (1.6 m)   Wt 49.1 kg (108 lb 3.9 oz)   BMI 19.17 kg/m²     Took stalevo 3.5 hours ago - starting to turn"OFF"    General Medical Examination:  General: The patient is alert and cooperative with the exam.   HEENT: Normocephalic, atraumatic.   Neck: Supple.   Chest: Unlabored breathing.   CV: Symmetric pulses.   Ext: No clubbing, cyanosis, or edema.     Mental Status:  General: Good hygiene, appropriate appearance.  Mood/Affect: Appropriate/congruent.  Level of consciousness: Awake, alert.  Orientation: Oriented to person, place, time and situation.  Language: No Dysarthria  Remembers 1/3 at 5 mins    Cranial nerves:  I: Not tested  II: PERRL, VFF to counting  III, IV, VI: EOMI with conjugate gaze and no nystagmus on end gaze  V: Facial sensation " intact and symmetric over the bilateral V1-V3  VII: Facial muscle activation intact and symmetric over the bilateral upper and lower face  VIII: Hearing intact in the b/l ears and symmetrical to finger rub  IX, X, XII: TUP midline  X: SCMs and shoulder shrug full strength b/l and symmetric    Motor:   Strength Intact throughout upper and lower extremities, 5/5.  Fasciculations/Atrophy: None    Mild dyskinesias in shoulders  Resting tremor R arm and R leg    DTRs:  ? Biceps Triceps Brachioradialis Knee Ankle   Left 2+ 2+ 2+ 2+ 0   Right 2+ 2+ 2+ 2+ 0       ? Finger taps Finger flicks HERMAN Heel taps   Left 1+ 1+ 1+ 1+   Right 1+ 0 1+ 1+   Neck tone: 1+  ? Arm Leg   Left 1+ 0   Right 1+ 0     Sensation:   -Light touch: Intact and symmetric in the bilateral upper and lower extremities.  -Temp: Intact and symmetric in the bilateral upper and lower extremities.  -Vibration: Intact and symmetric in the bilateral upper and lower extremities.  -Pin prick: Intact and symmetric in the bilateral upper and lower extremities.  -Proprioception: Intact and symmetric in the bilateral upper and lower extremities.    Coordination:   -Finger to nose: nl    Gait:  -Arises from chair without use of hands.  -Casual gait is: narrow based  -Turnin-step  No shuffle  Cannot tandem     Laboratory Data:  NA    Imaging:  NA    Assessment//Plan:   Problem List Items Addressed This Visit        Neuro    Parkinson disease    Current Assessment & Plan     Tremor predominate PD with dyskinesias. Per her journal, dyskinesias and tremor seem to cluster around end of dose. This was confirmed by her exam today as she was wearing off. ONtime seems to be 3-3.4 hours. Suggested increasing pill frequency to curb these effects. Stavevo at 7:30AM/10:30AM/1PM/4PM/7PM. Continue Azilect 1mg QHS.            Psychiatric    Anxiety    Current Assessment & Plan     Suggested starting sertraline 25mg QHS for generalized anxiety. We discussed the symptoms of  serotonin syndrome and she knows to go to the ER should she have stiffness fevers and confusion, which can be an extremely rare interaction between sertraline and rasagiline.            Other    Insomnia    Current Assessment & Plan     Suggested melatonin 4mg at 7PM for insomnia.             Follow-up: 1 month  Discussed the importance of ongoing exercise in efforts to improve mobility and balance.    Serina Best MD, MS  [facility]  Department of Neurology  Movement Disorders

## 2019-01-10 NOTE — ASSESSMENT & PLAN NOTE
Suggested starting sertraline 25mg QHS for generalized anxiety. We discussed the symptoms of serotonin syndrome and she knows to go to the ER should she have stiffness fevers and confusion, which can be an extremely rare interaction between sertraline and rasagiline.

## 2019-02-11 ENCOUNTER — OFFICE VISIT (OUTPATIENT)
Dept: NEUROLOGY | Facility: CLINIC | Age: 79
End: 2019-02-11
Payer: MEDICARE

## 2019-02-11 VITALS
WEIGHT: 107.38 LBS | SYSTOLIC BLOOD PRESSURE: 119 MMHG | HEART RATE: 59 BPM | DIASTOLIC BLOOD PRESSURE: 69 MMHG | BODY MASS INDEX: 19.03 KG/M2 | HEIGHT: 63 IN

## 2019-02-11 DIAGNOSIS — G47.00 INSOMNIA, UNSPECIFIED TYPE: ICD-10-CM

## 2019-02-11 DIAGNOSIS — R41.3 MEMORY LOSS: ICD-10-CM

## 2019-02-11 DIAGNOSIS — G20.A1 PARKINSON DISEASE: ICD-10-CM

## 2019-02-11 PROCEDURE — 99214 PR OFFICE/OUTPT VISIT, EST, LEVL IV, 30-39 MIN: ICD-10-PCS | Mod: S$PBB,,, | Performed by: PSYCHIATRY & NEUROLOGY

## 2019-02-11 PROCEDURE — 99213 OFFICE O/P EST LOW 20 MIN: CPT | Mod: PBBFAC | Performed by: PSYCHIATRY & NEUROLOGY

## 2019-02-11 PROCEDURE — 99999 PR PBB SHADOW E&M-EST. PATIENT-LVL III: CPT | Mod: PBBFAC,,, | Performed by: PSYCHIATRY & NEUROLOGY

## 2019-02-11 PROCEDURE — 99999 PR PBB SHADOW E&M-EST. PATIENT-LVL III: ICD-10-PCS | Mod: PBBFAC,,, | Performed by: PSYCHIATRY & NEUROLOGY

## 2019-02-11 PROCEDURE — 99214 OFFICE O/P EST MOD 30 MIN: CPT | Mod: S$PBB,,, | Performed by: PSYCHIATRY & NEUROLOGY

## 2019-02-11 RX ORDER — CARBIDOPA 25 MG/1
25 TABLET ORAL 4 TIMES DAILY
Qty: 120 TABLET | Refills: 11 | Status: SHIPPED | OUTPATIENT
Start: 2019-02-11 | End: 2020-02-11

## 2019-02-11 RX ORDER — CARBIDOPA, LEVODOPA AND ENTACAPONE 25; 200; 100 MG/1; MG/1; MG/1
1 TABLET, FILM COATED ORAL
Qty: 150 TABLET | Refills: 11 | Status: SHIPPED | OUTPATIENT
Start: 2019-02-11 | End: 2020-01-06 | Stop reason: SDUPTHER

## 2019-02-11 RX ORDER — SERTRALINE HYDROCHLORIDE 25 MG/1
25 TABLET, FILM COATED ORAL DAILY
Qty: 30 TABLET | Refills: 11 | Status: SHIPPED | OUTPATIENT
Start: 2019-02-11 | End: 2019-04-23

## 2019-02-11 RX ORDER — RASAGILINE 0.5 MG/1
0.5 TABLET ORAL DAILY
Qty: 90 TABLET | Refills: 1 | Status: SHIPPED | OUTPATIENT
Start: 2019-02-11 | End: 2019-10-15 | Stop reason: SDUPTHER

## 2019-02-11 NOTE — ASSESSMENT & PLAN NOTE
Tremor-predominate PD. Continues stalevo and struggles with both peak and end-dose dyskinesia. No dose failures noted. Well controlled on stalevo 25/100/200 Q4H. Suggested trying carbidopa 25mg Q4H to help decrease nausea.  We have not yet tried amantadine for dyskinesias but holding off due to memory.  Continue Azilect 1mg QHS.

## 2019-02-11 NOTE — PROGRESS NOTES
"MOVEMENT DISORDERS CLINIC Followup    PCP/Referring Provider: No referring provider defined for this encounter.  Date of Service: 2/11/2019    Chief Complaint: PD care    Interval Hx  Since last visit, started taking stalevo 5x a day - this caused more dyskinesias and more nausea so has cut back to 4x a day. Dyskinesias made her imbalanced and she fell twice  She has a typical 3-4 hour ONtime where hand tremors get better and walking improves. Tremor is by far worse than walking difficulties  No dizziness, hallucinations    Takes Stelevo 25/100/200 - 7:30AM, 11AM, 2:30AM, 6PM  No falls on this dosing, does not use an assist device  Notices dyskinesias and tremors as dose is wearing off per the journal she wrote me    Started zoloft 25mg QHS 11 days ago. No change to mood. Continues to have generalized anxiety.   Continues Azilect 1mg QHS    PD Review of Symptoms:  Anosmia: None  Dysarthria/Hypophonia: Mild  Dysphagia/Sialorrhea: Mild dysphagia  Hallucinations: At times sees things out of the corner of her eye  Depression: Has irrational anxiety at times - continues to struggle with this   Cognitive slowing: mild, on namenda  Impulsivity: none  Obsessions/Compulsions:   -none  Urinary changes: frequency  Constipation: constipation- on miralax  Orthostasis: None current, past it was severe  Dyskinesia: Yes, see above  Falls: None  Freezing: None  Micrographia: Yes  Sleep issues:  -DEBBIE: None  -RBD: Yes   -Sleep Quality: Nocturia, insomnia - sits in bed and cannot "turn off mind" - started melatonin 6mg and has improved      "Prior HPI: Vanesa Rae is a R HANDED 79 y.o. female with a PMH significant for PD, diagnosed at age 68, who followed Dr. Yip, coming to establish care. At first she noticed a tremor tremor when writing on a chalk board and eating soup. Over time the tremor increased and is now present in 4 limbs R>L. This remains her most incapacitating issue. She hasn't noticed shuffling gait until very " "recently in the last year. She's noticed slight stiffness in her R shoulder for several years. Gait is fairly well preserved. She fell once due to syncope but otherwise 2 more falls total. Feels like her balance is not normal but fairly good. Easily walks several blocks despite not exercising much. No assist device. Walks around her garden for 20 minutes a day. She's noticed toe curling R>L in the last 2 months in the AM before her sinemet and sometimes at night.    In terms of orthostasis, this was a major issue for her for several years. She was having 4 syncopal spells on standing. This was determined to be medication-related. She feels like the azilect was causing the issue. At this point she no longer has orthostatic moments.    In terms of medications, tried Dopamine agonists, and then transitioned to Stavelo in the last several years.  She is currently on Stalevo 25/100/200 9am/1PM/4PM/8PM  She has intermittent OFFtime brett in the afternoon  Offtime in AM with offtime dystonia  ONtime is 3.5 hours typically    Mild dyskinesias which do not typically bother her  Takes clonopin at 8PM     No fam hx PD"    Review of Systems:   Review of Systems   Constitutional: Negative for fever.   HENT: Negative for congestion.    Eyes: Negative for double vision.   Respiratory: Negative for cough and shortness of breath.    Cardiovascular: Negative for chest pain and leg swelling.   Gastrointestinal: Negative for nausea.   Genitourinary: Positive for frequency. Negative for dysuria.   Musculoskeletal: Positive for falls.   Skin: Negative for rash.   Neurological: Positive for tremors and speech change. Negative for headaches.   Psychiatric/Behavioral: Positive for memory loss. Negative for depression.     Neuroleptic exposure:  None    Current Medications:  Outpatient Encounter Medications as of 2/11/2019   Medication Sig Dispense Refill    carbidopa-levodopa-entacapone -200 mg (STALEVO 100) -200 mg Tab Take 1 " "tablet by mouth 5 (five) times daily. 150 tablet 11    clonazePAM (KLONOPIN) 0.5 MG tablet Take 0.5 mg by mouth.      cyanocobalamin, vitamin B-12, 2,500 mcg Tab Take 500 mcg by mouth.      ipratropium (ATROVENT) 0.03 % nasal spray INHALE 2 SPRAYS IN EACH NOSTRIL 4 TIMES DAILY.      memantine (NAMENDA) 10 MG Tab Take 10 mg by mouth 2 (two) times daily.       RESTASIS 0.05 % ophthalmic emulsion       sertraline (ZOLOFT) 25 MG tablet Take 1 tablet (25 mg total) by mouth once daily. 30 tablet 11    carbidopa (LODOSYN) 25 mg tablet Take 1 tablet (25 mg total) by mouth 4 (four) times daily. 120 tablet 11    rasagiline (AZILECT) 0.5 MG Tab Take 1 tablet (0.5 mg total) by mouth once daily. 90 tablet 1     No facility-administered encounter medications on file as of 2/11/2019.        Past Medical History:  No relevant PMHX    Past Surgical History:  Hysterectomy    Current Living Situation: lives with family -  has PD    Social:  Social History     Socioeconomic History    Marital status:      Spouse name: Not on file    Number of children: Not on file    Years of education: Not on file    Highest education level: Not on file   Social Needs    Financial resource strain: Not on file    Food insecurity - worry: Not on file    Food insecurity - inability: Not on file    Transportation needs - medical: Not on file    Transportation needs - non-medical: Not on file   Occupational History    Not on file   Tobacco Use    Smoking status: Never Smoker   Substance and Sexual Activity    Alcohol use: No    Drug use: No    Sexual activity: Not on file   Other Topics Concern    Not on file   Social History Narrative    Not on file       Family History:  See above    PHYSICAL:  /69   Pulse (!) 59   Ht 5' 3" (1.6 m)   Wt 48.7 kg (107 lb 5.8 oz)   BMI 19.02 kg/m²     Took stalevo 3 hours ago - starting to turn "OFF"    General Medical Examination:  General: The patient is alert and " cooperative with the exam.   HEENT: Normocephalic, atraumatic.   Neck: Supple.   Chest: Unlabored breathing.   CV: Symmetric pulses.   Ext: No clubbing, cyanosis, or edema.     Mental Status:  General: Good hygiene, appropriate appearance.  Mood/Affect: Appropriate/congruent.  Level of consciousness: Awake, alert.  Orientation: Oriented to person, place, time and situation.  Language: No Dysarthria  Remembers 1/3 at 5 mins    Cranial nerves:  I: Not tested  II: PERRL, VFF to counting  III, IV, VI: EOMI with conjugate gaze and no nystagmus on end gaze  V: Facial sensation intact and symmetric over the bilateral V1-V3  VII: Facial muscle activation intact and symmetric over the bilateral upper and lower face  VIII: Hearing intact in the b/l ears and symmetrical to finger rub  IX, X, XII: TUP midline  X: SCMs and shoulder shrug full strength b/l and symmetric    Motor:   Strength Intact throughout upper and lower extremities, 5/5.  Fasciculations/Atrophy: None    No dyskinesias in shoulders  No resting tremor    DTRs:  ? Biceps Triceps Brachioradialis Knee Ankle   Left 2+ 2+ 2+ 2+ 0   Right 2+ 2+ 2+ 2+ 0       ? Finger taps Finger flicks HERMAN Heel taps   Left 2+ 2+ 1+ 1+   Right 1+ 0 1+ 1+   Neck tone: 1+  ? Arm Leg   Left 1+ 0   Right 1+ 0     Sensation:   -Light touch: Intact and symmetric in the bilateral upper and lower extremities.  -Temp: Intact and symmetric in the bilateral upper and lower extremities.  -Vibration: Intact and symmetric in the bilateral upper and lower extremities.  -Pin prick: Intact and symmetric in the bilateral upper and lower extremities.  -Proprioception: Intact and symmetric in the bilateral upper and lower extremities.    Coordination:   -Finger to nose: nl    Gait:  -Arises from chair without use of hands.  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    Problem List Items Addressed This Visit        Neuro    Parkinson disease    Current Assessment & Plan     Tremor-predominate PD. Continues stalevo and struggles with both peak and end-dose dyskinesia. No dose failures noted. Well controlled on stalevo 25/100/200 Q4H. Suggested trying carbidopa 25mg Q4H to help decrease nausea.  We have not yet tried amantadine for dyskinesias but holding off due to memory.  Continue Azilect 1mg QHS.         Memory loss    Current Assessment & Plan     Mild to moderate  Memory loss of the years consistent with PDD. Continues namenda.            Other    Insomnia    Current Assessment & Plan     Better with melatonin 6mg at dinnertime.             Follow-up: 1 month  Discussed the importance of ongoing exercise in efforts to improve mobility and balance.    Serina Best MD, MS [facility]  Department of Neurology  Movement Disorders

## 2019-04-08 ENCOUNTER — TELEPHONE (OUTPATIENT)
Dept: NEUROLOGY | Facility: CLINIC | Age: 79
End: 2019-04-08

## 2019-04-08 NOTE — TELEPHONE ENCOUNTER
Reached out to patient to reschedule. Tentatively r/s for 4\23 at 1 pm but will check with son, with whom a voicemail was left with for any changes.

## 2019-04-23 ENCOUNTER — OFFICE VISIT (OUTPATIENT)
Dept: NEUROLOGY | Facility: CLINIC | Age: 79
End: 2019-04-23
Payer: MEDICARE

## 2019-04-23 VITALS
DIASTOLIC BLOOD PRESSURE: 41 MMHG | SYSTOLIC BLOOD PRESSURE: 96 MMHG | BODY MASS INDEX: 19.38 KG/M2 | WEIGHT: 109.38 LBS | HEART RATE: 67 BPM | HEIGHT: 63 IN

## 2019-04-23 DIAGNOSIS — G20.A1 PARKINSON DISEASE: ICD-10-CM

## 2019-04-23 DIAGNOSIS — R41.3 MEMORY LOSS: Primary | ICD-10-CM

## 2019-04-23 DIAGNOSIS — F41.9 ANXIETY: ICD-10-CM

## 2019-04-23 PROBLEM — G47.00 INSOMNIA: Status: RESOLVED | Noted: 2018-12-03 | Resolved: 2019-04-23

## 2019-04-23 PROCEDURE — 99999 PR PBB SHADOW E&M-EST. PATIENT-LVL III: ICD-10-PCS | Mod: PBBFAC,,, | Performed by: PSYCHIATRY & NEUROLOGY

## 2019-04-23 PROCEDURE — 99215 OFFICE O/P EST HI 40 MIN: CPT | Mod: S$PBB,,, | Performed by: PSYCHIATRY & NEUROLOGY

## 2019-04-23 PROCEDURE — 99213 OFFICE O/P EST LOW 20 MIN: CPT | Mod: PBBFAC | Performed by: PSYCHIATRY & NEUROLOGY

## 2019-04-23 PROCEDURE — 99999 PR PBB SHADOW E&M-EST. PATIENT-LVL III: CPT | Mod: PBBFAC,,, | Performed by: PSYCHIATRY & NEUROLOGY

## 2019-04-23 PROCEDURE — 99215 PR OFFICE/OUTPT VISIT, EST, LEVL V, 40-54 MIN: ICD-10-PCS | Mod: S$PBB,,, | Performed by: PSYCHIATRY & NEUROLOGY

## 2019-04-23 RX ORDER — SERTRALINE HYDROCHLORIDE 25 MG/1
50 TABLET, FILM COATED ORAL DAILY
Qty: 60 TABLET | Refills: 11 | Status: SHIPPED | OUTPATIENT
Start: 2019-04-23 | End: 2020-04-22

## 2019-04-23 RX ORDER — CLONAZEPAM 0.5 MG/1
0.5 TABLET ORAL NIGHTLY
Qty: 30 TABLET | Refills: 5 | Status: SHIPPED | OUTPATIENT
Start: 2019-04-23 | End: 2019-10-15 | Stop reason: SDUPTHER

## 2019-04-23 NOTE — PROGRESS NOTES
"MOVEMENT DISORDERS CLINIC Followup    PCP/Referring Provider: Serina Best MD  [id number] [place] [address]  Date of Service: 4/23/2019    Chief Complaint: PD care    Interval Hx  Since last visit, Continues  taking stalevo 4x a day  She has a typical 3-4 hour ONtime where hand tremors get better and walking improves. Tremor is by far worse than walking difficulties  Takes Stalevo 25/100/200 - 8:00AM, 12PM, 4PM, 8PM  No major offtime noted  Added carbidopa 25mg at each dose and now nausea is rare  Mild OFFtime dystonia R foot.    No falls  Does not exercise  No dizziness, hallucinations    Started zoloft 25mg QHS 1 month ago. Continues to have generalized anxiety.   Continues Azilect 1mg QHS  Continues Namenda 10mg Po BID    PD Review of Symptoms:  Anosmia: None  Dysarthria/Hypophonia: Mild  Dysphagia/Sialorrhea: Mild dysphagia  Hallucinations: At times sees things out of the corner of her eye  Depression: Has irrational anxiety at times  Cognitive slowing: mild, on namenda  Impulsivity: none  Obsessions/Compulsions:   -none  Urinary changes: frequency  Constipation: constipation- on miralax  Orthostasis: None current, past it was severe  Dyskinesia: Yes, see above  Falls: None  Freezing: None  Micrographia: Yes  Sleep issues:  -DEBBIE: None  -RBD: Yes   -Sleep Quality: Nocturia, insomnia - sits in bed and cannot "turn off mind" - started melatonin 3mg and has improved      "Prior HPI: Vanesa Rae is a R HANDED 79 y.o. female with a PMH significant for PD, diagnosed at age 68, who followed Dr. Yip, coming to establish care. At first she noticed a tremor tremor when writing on a chalk board and eating soup. Over time the tremor increased and is now present in 4 limbs R>L. This remains her most incapacitating issue. She hasn't noticed shuffling gait until very recently in the last year. She's noticed slight stiffness in her R shoulder for several years. Gait is fairly well preserved. She fell once " "due to syncope but otherwise 2 more falls total. Feels like her balance is not normal but fairly good. Easily walks several blocks despite not exercising much. No assist device. Walks around her garden for 20 minutes a day. She's noticed toe curling R>L in the last 2 months in the AM before her sinemet and sometimes at night.    In terms of orthostasis, this was a major issue for her for several years. She was having 4 syncopal spells on standing. This was determined to be medication-related. She feels like the azilect was causing the issue. At this point she no longer has orthostatic moments.    In terms of medications, tried Dopamine agonists, and then transitioned to Stavelo in the last several years.  She is currently on Stalevo 25/100/200 9am/1PM/4PM/8PM  She has intermittent OFFtime brett in the afternoon  Offtime in AM with offtime dystonia  ONtime is 3.5 hours typically    Mild dyskinesias which do not typically bother her  Takes clonopin at 8PM     No fam hx PD"    Review of Systems:   Review of Systems   Constitutional: Negative for fever.   HENT: Negative for congestion.    Eyes: Negative for double vision.   Respiratory: Negative for cough and shortness of breath.    Cardiovascular: Negative for chest pain and leg swelling.   Gastrointestinal: Negative for nausea.   Genitourinary: Positive for frequency. Negative for dysuria.   Musculoskeletal: Positive for falls.   Skin: Negative for rash.   Neurological: Positive for tremors and speech change. Negative for headaches.   Psychiatric/Behavioral: Positive for memory loss. Negative for depression.     Neuroleptic exposure:  None    Current Medications:  Outpatient Encounter Medications as of 4/23/2019   Medication Sig Dispense Refill    carbidopa (LODOSYN) 25 mg tablet Take 1 tablet (25 mg total) by mouth 4 (four) times daily. 120 tablet 11    carbidopa-levodopa-entacapone -200 mg (STALEVO 100) -200 mg Tab Take 1 tablet by mouth 5 (five) times " daily. 150 tablet 11    clonazePAM (KLONOPIN) 0.5 MG tablet Take 1 tablet (0.5 mg total) by mouth every evening. 30 tablet 5    cyanocobalamin, vitamin B-12, 2,500 mcg Tab Take 500 mcg by mouth.      ipratropium (ATROVENT) 0.03 % nasal spray INHALE 2 SPRAYS IN EACH NOSTRIL 4 TIMES DAILY.      memantine (NAMENDA) 10 MG Tab Take 10 mg by mouth 2 (two) times daily.       rasagiline (AZILECT) 0.5 MG Tab Take 1 tablet (0.5 mg total) by mouth once daily. 90 tablet 1    RESTASIS 0.05 % ophthalmic emulsion       sertraline (ZOLOFT) 25 MG tablet Take 2 tablets (50 mg total) by mouth once daily. 60 tablet 11    [DISCONTINUED] clonazePAM (KLONOPIN) 0.5 MG tablet Take 0.5 mg by mouth.      [DISCONTINUED] sertraline (ZOLOFT) 25 MG tablet Take 1 tablet (25 mg total) by mouth once daily. 30 tablet 11     No facility-administered encounter medications on file as of 4/23/2019.        Past Medical History:  No relevant PMHX    Past Surgical History:  Hysterectomy    Current Living Situation: lives with family -  has PD    Social:  Social History     Socioeconomic History    Marital status:      Spouse name: Not on file    Number of children: Not on file    Years of education: Not on file    Highest education level: Not on file   Occupational History    Not on file   Social Needs    Financial resource strain: Not on file    Food insecurity:     Worry: Not on file     Inability: Not on file    Transportation needs:     Medical: Not on file     Non-medical: Not on file   Tobacco Use    Smoking status: Never Smoker   Substance and Sexual Activity    Alcohol use: No    Drug use: No    Sexual activity: Not on file   Lifestyle    Physical activity:     Days per week: Not on file     Minutes per session: Not on file    Stress: Not on file   Relationships    Social connections:     Talks on phone: Not on file     Gets together: Not on file     Attends Orthodox service: Not on file     Active member of  "club or organization: Not on file     Attends meetings of clubs or organizations: Not on file     Relationship status: Not on file   Other Topics Concern    Not on file   Social History Narrative    Not on file       Family History:  See above    PHYSICAL:  BP (!) 96/41   Pulse 67   Ht 5' 3" (1.6 m)   Wt 49.6 kg (109 lb 5.6 oz)   BMI 19.37 kg/m²   Took stalevo 1 hour ago - starting to turn "OFF"    General Medical Examination:  General: The patient is alert and cooperative with the exam.   HEENT: Normocephalic, atraumatic.   Neck: Supple.   Chest: Unlabored breathing.   CV: Symmetric pulses.   Ext: No clubbing, cyanosis, or edema.     Mental Status:  General: Good hygiene, appropriate appearance.  Mood/Affect: Appropriate/congruent.  Level of consciousness: Awake, alert.  Orientation: Oriented to person, place, time and situation.  Language: No Dysarthria  Remembers 1/3 at 5 mins    Cranial nerves:  I: Not tested  II: PERRL, VFF to counting  III, IV, VI: EOMI with conjugate gaze and no nystagmus on end gaze  V: Facial sensation intact and symmetric over the bilateral V1-V3  VII: Facial muscle activation intact and symmetric over the bilateral upper and lower face  VIII: Hearing intact in the b/l ears and symmetrical to finger rub  IX, X, XII: TUP midline  X: SCMs and shoulder shrug full strength b/l and symmetric    Motor:   Strength Intact throughout upper and lower extremities, 5/5.  Fasciculations/Atrophy: None    Mild dyskinesias in shoulders  No resting tremor    DTRs:  ? Biceps Triceps Brachioradialis Knee Ankle   Left 2+ 2+ 2+ 2+ 0   Right 2+ 2+ 2+ 2+ 0       ? Finger taps Finger flicks HERMAN Heel taps   Left 2+ 2+ 2+ 1+   Right 1+ 1+ 1+ 2+   Neck tone: 1+  ? Arm Leg   Left 1+ 0   Right 1+ 0     Sensation:   -Light touch: Intact and symmetric in the bilateral upper and lower extremities.  -Temp: Intact and symmetric in the bilateral upper and lower extremities.  -Vibration: Intact and symmetric in the " bilateral upper and lower extremities.  -Pin prick: Intact and symmetric in the bilateral upper and lower extremities.  -Proprioception: Intact and symmetric in the bilateral upper and lower extremities.    Coordination:   -Finger to nose: nl    Gait:  -Arises from chair without use of hands.  -Casual gait is: narrow based  -Turnin-step  No shuffle  Cannot tandem     Laboratory Data:  NA    Imaging:  NA    Assessment//Plan: Vanesa Rae is a R HANDED 79 y.o. female with a PMH significant for PD, diagnosed at age 68, who followed Dr. Yip, coming for PD care. She has improved.    Problem List Items Addressed This Visit        Neuro    Parkinson disease    Current Assessment & Plan     Tremor-predominate PD. Well controlled on stalevo 25/100/200 Q4H. Started carbidopa 25mg Q4H to help decrease nausea and this has helped immensely.  We have not yet tried amantadine for dyskinesias but holding off due to memory.  Continue Azilect 1mg QHS.  Suggested being more active.         Memory loss - Primary    Current Assessment & Plan     Mild to moderate  Memory loss of the years consistent with PDD. Continues namenda.            Psychiatric    Anxiety    Current Assessment & Plan     Suggested increasing to sertraline 50mg QHS for generalized anxiety. We discussed the symptoms of serotonin syndrome and she knows to go to the ER should she have stiffness fevers and confusion, which can be an extremely rare interaction between sertraline and rasagiline.             Follow-up: 3 months  Discussed the importance of ongoing exercise in efforts to improve mobility and balance.    Serina Best MD, MS [facility]  Department of Neurology  Movement Disorders

## 2019-04-23 NOTE — ASSESSMENT & PLAN NOTE
Tremor-predominate PD. Well controlled on stalevo 25/100/200 Q4H. Started carbidopa 25mg Q4H to help decrease nausea and this has helped immensely.  We have not yet tried amantadine for dyskinesias but holding off due to memory.  Continue Azilect 1mg QHS.  Suggested being more active.

## 2019-04-23 NOTE — ASSESSMENT & PLAN NOTE
Suggested increasing to sertraline 50mg QHS for generalized anxiety. We discussed the symptoms of serotonin syndrome and she knows to go to the ER should she have stiffness fevers and confusion, which can be an extremely rare interaction between sertraline and rasagiline.

## 2019-08-26 ENCOUNTER — OFFICE VISIT (OUTPATIENT)
Dept: NEUROLOGY | Facility: CLINIC | Age: 79
End: 2019-08-26
Payer: MEDICARE

## 2019-08-26 VITALS
DIASTOLIC BLOOD PRESSURE: 53 MMHG | BODY MASS INDEX: 19.3 KG/M2 | HEIGHT: 63 IN | WEIGHT: 108.94 LBS | SYSTOLIC BLOOD PRESSURE: 74 MMHG | HEART RATE: 63 BPM

## 2019-08-26 DIAGNOSIS — G20.A1 PARKINSON DISEASE: ICD-10-CM

## 2019-08-26 DIAGNOSIS — I95.1 ORTHOSTASIS: ICD-10-CM

## 2019-08-26 DIAGNOSIS — R41.3 MEMORY LOSS: ICD-10-CM

## 2019-08-26 PROCEDURE — 99999 PR PBB SHADOW E&M-EST. PATIENT-LVL IV: ICD-10-PCS | Mod: PBBFAC,,, | Performed by: PSYCHIATRY & NEUROLOGY

## 2019-08-26 PROCEDURE — 99215 PR OFFICE/OUTPT VISIT, EST, LEVL V, 40-54 MIN: ICD-10-PCS | Mod: S$PBB,,, | Performed by: PSYCHIATRY & NEUROLOGY

## 2019-08-26 PROCEDURE — 99214 OFFICE O/P EST MOD 30 MIN: CPT | Mod: PBBFAC | Performed by: PSYCHIATRY & NEUROLOGY

## 2019-08-26 PROCEDURE — 99215 OFFICE O/P EST HI 40 MIN: CPT | Mod: S$PBB,,, | Performed by: PSYCHIATRY & NEUROLOGY

## 2019-08-26 PROCEDURE — 99999 PR PBB SHADOW E&M-EST. PATIENT-LVL IV: CPT | Mod: PBBFAC,,, | Performed by: PSYCHIATRY & NEUROLOGY

## 2019-08-26 RX ORDER — CYCLOSPORINE 0.5 MG/ML
1 EMULSION OPHTHALMIC
COMMUNITY

## 2019-08-26 RX ORDER — MIDODRINE HYDROCHLORIDE 2.5 MG/1
2.5 TABLET ORAL 3 TIMES DAILY
Qty: 90 TABLET | Refills: 11 | Status: SHIPPED | OUTPATIENT
Start: 2019-08-26 | End: 2020-04-06 | Stop reason: SDUPTHER

## 2019-08-26 RX ORDER — MEMANTINE HYDROCHLORIDE 10 MG/1
10 TABLET ORAL
COMMUNITY
End: 2019-10-15 | Stop reason: SDUPTHER

## 2019-08-26 RX ORDER — RASAGILINE 0.5 MG/1
0.5 TABLET ORAL
COMMUNITY
Start: 2019-02-11 | End: 2020-07-20

## 2019-08-26 RX ORDER — IPRATROPIUM BROMIDE 21 UG/1
SPRAY, METERED NASAL
COMMUNITY
Start: 2019-07-22

## 2019-08-26 RX ORDER — SERTRALINE HYDROCHLORIDE 25 MG/1
TABLET, FILM COATED ORAL
COMMUNITY
Start: 2019-08-14 | End: 2022-11-08

## 2019-08-26 RX ORDER — CARBIDOPA 25 MG/1
TABLET ORAL
COMMUNITY
Start: 2019-08-07 | End: 2020-02-14

## 2019-08-26 NOTE — ASSESSMENT & PLAN NOTE
Moderate orthostasis, per patient. May be causing fatigue. Certainly causing fall risk. Suggested wearing an abdominal binder and starting midodrine 2.5mg Po TID. She knows not to lie down directly after mididrine.

## 2019-08-26 NOTE — ASSESSMENT & PLAN NOTE
Tremor-predominate PD. Well controlled on stalevo 25/100/200 Q4H. Continue carbidopa 25mg Q4H to help decrease nausea.  We have not yet tried amantadine for dyskinesias but holding off due to memory.  Continue Azilect 1mg QHS.  Suggested being more active.

## 2019-08-26 NOTE — PROGRESS NOTES
"MOVEMENT DISORDERS CLINIC Followup    PCP/Referring Provider: Aaareferral Self  No address on file  Date of Service: 8/26/2019    Chief Complaint: PD care    Interval Hx    Since last visit increased sertraline to 50mg QHS. Anxiety better controlled.    Since last visit, Continues taking stalevo 4x a day  She has a typical 3-4 hour ONtime where hand tremors get better and walking improves. Tremor is by far worse than walking difficulties  Takes Stalevo 25/100/200 - 8:00AM, 12PM, 4PM, 8PM  Notes 30 mins offtime after dose wears off with leg tremor  Mild dyskinesias in arms noted    Added carbidopa 25mg at each dose and now nausea is rare  Mild OFFtime dystonia R foot.    Does note dizziness on standing at times.   Worse in the summer. At times symptoms are delayed after standing and she feels about to black out.  Notes that she was on northera many years ago    No falls  Does not exercise  No dizziness, hallucinations    Continues Azilect 1mg QHS  Continues Namenda 10mg Po BID    PD Review of Symptoms:  Anosmia: None  Dysarthria/Hypophonia: Mild  Dysphagia/Sialorrhea: Mild dysphagia  Hallucinations: At times sees things out of the corner of her eye  Depression: Has irrational anxiety at times  Cognitive slowing: mild, on namenda  Impulsivity: none  Obsessions/Compulsions:   -none  Urinary changes: frequency  Constipation: constipation- on miralax  Orthostasis: As above  Dyskinesia: Yes, see above  Falls: None  Freezing: None  Micrographia: Yes  Sleep issues:  -DEBBIE: None  -RBD: Yes   -Sleep Quality: Nocturia, insomnia - sits in bed and cannot "turn off mind" - started melatonin 3mg and has improved      "Prior HPI: Vanesa Rae is a R HANDED 79 y.o. female with a PMH significant for PD, diagnosed at age 68, who followed Dr. Yip, coming to establish care. At first she noticed a tremor tremor when writing on a chalk board and eating soup. Over time the tremor increased and is now present in 4 limbs R>L. This remains " "her most incapacitating issue. She hasn't noticed shuffling gait until very recently in the last year. She's noticed slight stiffness in her R shoulder for several years. Gait is fairly well preserved. She fell once due to syncope but otherwise 2 more falls total. Feels like her balance is not normal but fairly good. Easily walks several blocks despite not exercising much. No assist device. Walks around her garden for 20 minutes a day. She's noticed toe curling R>L in the last 2 months in the AM before her sinemet and sometimes at night.    In terms of orthostasis, this was a major issue for her for several years. She was having 4 syncopal spells on standing. This was determined to be medication-related. She feels like the azilect was causing the issue. At this point she no longer has orthostatic moments.    In terms of medications, tried Dopamine agonists, and then transitioned to Stavelo in the last several years.  She is currently on Stalevo 25/100/200 9am/1PM/4PM/8PM  She has intermittent OFFtime brett in the afternoon  Offtime in AM with offtime dystonia  ONtime is 3.5 hours typically    Mild dyskinesias which do not typically bother her  Takes clonopin at 8PM     No fam hx PD"    Review of Systems:   Review of Systems   Constitutional: Negative for fever.   HENT: Negative for congestion.    Eyes: Negative for double vision.   Respiratory: Negative for cough and shortness of breath.    Cardiovascular: Negative for chest pain and leg swelling.   Gastrointestinal: Negative for nausea.   Genitourinary: Positive for frequency. Negative for dysuria.   Musculoskeletal: Positive for falls.   Skin: Negative for rash.   Neurological: Positive for tremors and speech change. Negative for headaches.   Psychiatric/Behavioral: Positive for memory loss. Negative for depression.     Neuroleptic exposure:  None    Current Medications:  Outpatient Encounter Medications as of 8/26/2019   Medication Sig Dispense Refill    carbidopa " (LODOSYN) 25 mg tablet Take 1 tablet (25 mg total) by mouth 4 (four) times daily. 120 tablet 11    carbidopa (LODOSYN) 25 mg tablet       carbidopa-levodopa-entacapone -200 mg (STALEVO 100) -200 mg Tab Take 1 tablet by mouth 5 (five) times daily. 150 tablet 11    clonazePAM (KLONOPIN) 0.5 MG tablet Take 1 tablet (0.5 mg total) by mouth every evening. 30 tablet 5    cyanocobalamin, vitamin B-12, 2,500 mcg Tab Take 500 mcg by mouth.      cycloSPORINE (RESTASIS) 0.05 % ophthalmic emulsion 1 drop.      ipratropium (ATROVENT) 0.03 % nasal spray INHALE 2 SPRAYS IN EACH NOSTRIL 4 TIMES DAILY.      ipratropium (ATROVENT) 0.03 % nasal spray INHALE 2 SPRAYS IN EACH NOSTRIL 4 TIMES DAILY.      memantine (NAMENDA) 10 MG Tab Take 10 mg by mouth 2 (two) times daily.       memantine (NAMENDA) 10 MG Tab Take 10 mg by mouth.      rasagiline (AZILECT) 0.5 MG Tab Take 0.5 mg by mouth.      RESTASIS 0.05 % ophthalmic emulsion       sertraline (ZOLOFT) 25 MG tablet Take 2 tablets (50 mg total) by mouth once daily. 60 tablet 11    sertraline (ZOLOFT) 25 MG tablet       midodrine (PROAMATINE) 2.5 MG Tab Take 1 tablet (2.5 mg total) by mouth 3 (three) times daily. 90 tablet 11    rasagiline (AZILECT) 0.5 MG Tab Take 1 tablet (0.5 mg total) by mouth once daily. 90 tablet 1     No facility-administered encounter medications on file as of 8/26/2019.        Past Medical History:  No relevant PMHX    Past Surgical History:  Hysterectomy    Current Living Situation: lives with family -  has PD    Social:  Social History     Socioeconomic History    Marital status:      Spouse name: Not on file    Number of children: Not on file    Years of education: Not on file    Highest education level: Not on file   Occupational History    Not on file   Social Needs    Financial resource strain: Not on file    Food insecurity:     Worry: Not on file     Inability: Not on file    Transportation needs:      "Medical: Not on file     Non-medical: Not on file   Tobacco Use    Smoking status: Never Smoker   Substance and Sexual Activity    Alcohol use: No    Drug use: No    Sexual activity: Not on file   Lifestyle    Physical activity:     Days per week: Not on file     Minutes per session: Not on file    Stress: Not on file   Relationships    Social connections:     Talks on phone: Not on file     Gets together: Not on file     Attends Congregation service: Not on file     Active member of club or organization: Not on file     Attends meetings of clubs or organizations: Not on file     Relationship status: Not on file   Other Topics Concern    Not on file   Social History Narrative    Not on file       Family History:  See above    PHYSICAL:  Sitting 94/55  BP (!) 74/53   Pulse 63   Ht 5' 3" (1.6 m)   Wt 49.4 kg (108 lb 14.5 oz)   BMI 19.29 kg/m²   Took stalevo 1 hour ago - starting to turn "OFF"    General Medical Examination:  General: The patient is alert and cooperative with the exam.   HEENT: Normocephalic, atraumatic.   Neck: Supple.   Chest: Unlabored breathing.   CV: Symmetric pulses.   Ext: No clubbing, cyanosis, or edema.     Mental Status:  General: Good hygiene, appropriate appearance.  Mood/Affect: Appropriate/congruent.  Level of consciousness: Awake, alert.  Orientation: Oriented to person, place, time and situation.  Language: No Dysarthria  Remembers 1/3 at 5 mins    Cranial nerves:  I: Not tested  II: PERRL, VFF to counting  III, IV, VI: EOMI with conjugate gaze and no nystagmus on end gaze  V: Facial sensation intact and symmetric over the bilateral V1-V3  VII: Facial muscle activation intact and symmetric over the bilateral upper and lower face  VIII: Hearing intact in the b/l ears and symmetrical to finger rub  IX, X, XII: TUP midline  X: SCMs and shoulder shrug full strength b/l and symmetric    Motor:   Strength Intact throughout upper and lower extremities, 5/5.  Fasciculations/Atrophy: " None    Mild dyskinesias in shoulders  No resting tremor    DTRs:  ? Biceps Triceps Brachioradialis Knee Ankle   Left 2+ 2+ 2+ 2+ 0   Right 2+ 2+ 2+ 2+ 0       ? Finger taps Finger flicks HERMAN Heel taps   Left 2+ 2+ 2+ 1+   Right 1+ 1+ 1+ 2+   Neck tone: 1+  ? Arm Leg   Left 1+ 0   Right 1+ 0     Sensation:   -Light touch: Intact and symmetric in the bilateral upper and lower extremities.  -Temp: Intact and symmetric in the bilateral upper and lower extremities.  -Vibration: Intact and symmetric in the bilateral upper and lower extremities.  -Pin prick: Intact and symmetric in the bilateral upper and lower extremities.  -Proprioception: Intact and symmetric in the bilateral upper and lower extremities.    Coordination:   -Finger to nose: nl    Gait:  -Arises from chair without use of hands.  -Casual gait is: narrow based  -Turnin-step  No shuffle  Cannot tandem     Laboratory Data:  NA    Imaging:  NA    Assessment//Plan: Vanesa Rae is a R HANDED 79 y.o. female with a PMH significant for PD, diagnosed at age 68, who followed Dr. Yip, coming for PD care. She has improved.    Problem List Items Addressed This Visit        Neuro    Parkinson disease    Current Assessment & Plan     Tremor-predominate PD. Well controlled on stalevo 25/100/200 Q4H. Continue carbidopa 25mg Q4H to help decrease nausea.  We have not yet tried amantadine for dyskinesias but holding off due to memory.  Continue Azilect 1mg QHS.  Suggested being more active.         Memory loss    Current Assessment & Plan     Mild to moderate  Memory loss of the years consistent with PDD. Continues namenda.            Cardiac/Vascular    Orthostasis    Current Assessment & Plan     Moderate orthostasis, per patient. May be causing fatigue. Certainly causing fall risk. Suggested wearing an abdominal binder and starting midodrine 2.5mg Po TID. She knows not to lie down directly after mididrine.             Follow-up: 3 months  Discussed the importance  of ongoing exercise in efforts to improve mobility and balance.    Serina Best MD, MS  [facility]  Department of Neurology  Movement Disorders    Orthostasis

## 2019-10-15 NOTE — TELEPHONE ENCOUNTER
----- Message from Sharron Whiteside sent at 10/15/2019  1:37 PM CDT -----  Contact: pt   Rx Refill/Request     Is this a Refill or New Rx:  Refill   Rx Name and Strength:  memantine (NAMENDA) 10 MG Tab  rasagiline (AZILECT) 0.5 MG Tab and clonazePAM (KLONOPIN) 0.5 MG tablet  [facility] with phone number[facility] phone [facility] [phone]  Communication Preference: can you please call pt at [phone]  Additional Information: none    MELBA

## 2019-10-16 RX ORDER — CLONAZEPAM 0.5 MG/1
TABLET ORAL
Qty: 30 TABLET | Refills: 5 | Status: SHIPPED | OUTPATIENT
Start: 2019-10-16 | End: 2019-10-25 | Stop reason: SDUPTHER

## 2019-10-16 RX ORDER — RASAGILINE 0.5 MG/1
0.5 TABLET ORAL DAILY
Qty: 90 TABLET | Refills: 1 | Status: SHIPPED | OUTPATIENT
Start: 2019-10-16 | End: 2020-04-13

## 2019-10-16 RX ORDER — MEMANTINE HYDROCHLORIDE 10 MG/1
10 TABLET ORAL DAILY
Qty: 30 TABLET | Refills: 4 | Status: SHIPPED | OUTPATIENT
Start: 2019-10-16 | End: 2020-01-06 | Stop reason: SDUPTHER

## 2019-10-16 NOTE — TELEPHONE ENCOUNTER
----- Message from Eugenio Jackson sent at 10/16/2019 12:55 PM CDT -----  Contact: Patient @ [phone]  Rx Refill/Request     Is this a Refill or New Rx:  Yes ( pt is very low on medication )   Rx Name and Strength:  (memantine (NAMENDA) 10 MG Tab ) (rasagiline (AZILECT) 0.5 MG Tab ) &  ( clonazePAM (KLONOPIN) 0.5 MG tablet)     Preferred Pharmacy with phone number:     [facility] [place] [facility] [address] [address]  Phone: [phone] Fax: [phone]

## 2019-10-17 ENCOUNTER — PATIENT MESSAGE (OUTPATIENT)
Dept: NEUROLOGY | Facility: CLINIC | Age: 79
End: 2019-10-17

## 2019-10-25 NOTE — TELEPHONE ENCOUNTER
----- Message from Violeta Kruse sent at 10/25/2019  2:10 PM CDT -----  Contact: pt at [phone]  Rx Refill/Request     Is this a Refill or New Rx:  new  Rx Name and Strength:  Klonopin  0.5mg  Qty 30  Preferred Pharmacy with phone number: [facility] at [phone]  Communication Preference:call pt when taken care of.  Additional Information:

## 2019-10-28 RX ORDER — CLONAZEPAM 0.5 MG/1
TABLET ORAL
Qty: 30 TABLET | Refills: 3 | Status: SHIPPED | OUTPATIENT
Start: 2019-10-28 | End: 2020-02-14

## 2019-10-28 RX ORDER — CLONAZEPAM 0.5 MG/1
0.5 TABLET ORAL NIGHTLY
Qty: 30 TABLET | Refills: 5 | Status: CANCELLED | OUTPATIENT
Start: 2019-10-28

## 2019-10-28 NOTE — TELEPHONE ENCOUNTER
----- Message from Shital Hart sent at 10/28/2019  1:16 PM CDT -----  Contact: Sharron pedersen/ [facility]  tel:  [phone]  Caller says:   They did not get the order for the Lorazepam.    Pls send today  To fax no:   [phone]  Or  escript it, or  call.    Pt. Is requesting this.    Son of pt.says he spoke to someone at the office about this and that the pharmacy should already have it.    Pls call to discuss.

## 2020-01-06 ENCOUNTER — OFFICE VISIT (OUTPATIENT)
Dept: NEUROLOGY | Facility: CLINIC | Age: 80
End: 2020-01-06
Payer: MEDICARE

## 2020-01-06 VITALS
SYSTOLIC BLOOD PRESSURE: 92 MMHG | BODY MASS INDEX: 19.69 KG/M2 | HEIGHT: 63 IN | WEIGHT: 111.13 LBS | DIASTOLIC BLOOD PRESSURE: 46 MMHG | HEART RATE: 59 BPM

## 2020-01-06 DIAGNOSIS — G20.A1 PARKINSON DISEASE: ICD-10-CM

## 2020-01-06 DIAGNOSIS — I95.1 ORTHOSTASIS: ICD-10-CM

## 2020-01-06 DIAGNOSIS — R41.3 MEMORY LOSS: ICD-10-CM

## 2020-01-06 PROCEDURE — 1126F AMNT PAIN NOTED NONE PRSNT: CPT | Mod: ,,, | Performed by: PSYCHIATRY & NEUROLOGY

## 2020-01-06 PROCEDURE — 99213 OFFICE O/P EST LOW 20 MIN: CPT | Mod: PBBFAC | Performed by: PSYCHIATRY & NEUROLOGY

## 2020-01-06 PROCEDURE — 99999 PR PBB SHADOW E&M-EST. PATIENT-LVL III: ICD-10-PCS | Mod: PBBFAC,,, | Performed by: PSYCHIATRY & NEUROLOGY

## 2020-01-06 PROCEDURE — 99215 PR OFFICE/OUTPT VISIT, EST, LEVL V, 40-54 MIN: ICD-10-PCS | Mod: S$PBB,,, | Performed by: PSYCHIATRY & NEUROLOGY

## 2020-01-06 PROCEDURE — 1159F PR MEDICATION LIST DOCUMENTED IN MEDICAL RECORD: ICD-10-PCS | Mod: ,,, | Performed by: PSYCHIATRY & NEUROLOGY

## 2020-01-06 PROCEDURE — 99999 PR PBB SHADOW E&M-EST. PATIENT-LVL III: CPT | Mod: PBBFAC,,, | Performed by: PSYCHIATRY & NEUROLOGY

## 2020-01-06 PROCEDURE — 1159F MED LIST DOCD IN RCRD: CPT | Mod: ,,, | Performed by: PSYCHIATRY & NEUROLOGY

## 2020-01-06 PROCEDURE — 1126F PR PAIN SEVERITY QUANTIFIED, NO PAIN PRESENT: ICD-10-PCS | Mod: ,,, | Performed by: PSYCHIATRY & NEUROLOGY

## 2020-01-06 PROCEDURE — 99215 OFFICE O/P EST HI 40 MIN: CPT | Mod: S$PBB,,, | Performed by: PSYCHIATRY & NEUROLOGY

## 2020-01-06 RX ORDER — POLYETHYLENE GLYCOL 3350 17 G/17G
POWDER, FOR SOLUTION ORAL
COMMUNITY

## 2020-01-06 RX ORDER — MEMANTINE HYDROCHLORIDE 10 MG/1
10 TABLET ORAL 2 TIMES DAILY
Qty: 60 TABLET | Refills: 4 | Status: SHIPPED | OUTPATIENT
Start: 2020-01-06 | End: 2020-09-08

## 2020-01-06 RX ORDER — CARBIDOPA, LEVODOPA AND ENTACAPONE 25; 200; 100 MG/1; MG/1; MG/1
1 TABLET, FILM COATED ORAL
Qty: 150 TABLET | Refills: 11 | Status: SHIPPED | OUTPATIENT
Start: 2020-01-06 | End: 2020-04-06 | Stop reason: SDUPTHER

## 2020-01-06 RX ORDER — MELATONIN 3 MG
3 CAPSULE ORAL
COMMUNITY

## 2020-01-06 RX ORDER — DONEPEZIL HYDROCHLORIDE 5 MG/1
5 TABLET, FILM COATED ORAL NIGHTLY
Qty: 30 TABLET | Refills: 11 | Status: SHIPPED | OUTPATIENT
Start: 2020-01-06 | End: 2021-01-05

## 2020-01-06 NOTE — ASSESSMENT & PLAN NOTE
Mild to moderate  Memory loss of the years consistent with PDD. Continues namenda 10mg PO BID. Suggested Aricept 5mg QHS

## 2020-01-06 NOTE — ASSESSMENT & PLAN NOTE
Tremor-predominate PD. Well controlled on stalevo 25/100/200 Q4H. Continue carbidopa 25mg Q3.5-4H to help decrease nausea.  We have not yet tried amantadine for dyskinesias but holding off due to memory.  Continue Azilect 1mg QHS.  Suggested being more active.

## 2020-01-06 NOTE — ASSESSMENT & PLAN NOTE
Orthostasis better, per patient. Suggested wearing an abdominal binder and CONTINUING midodrine 2.5mg Po TID. She knows not to lie down directly after mididrine.

## 2020-01-06 NOTE — PROGRESS NOTES
"MOVEMENT DISORDERS CLINIC Followup    PCP/Referring Provider: No referring provider defined for this encounter.  Date of Service: 1/6/2020    Chief Complaint: PD care    Interval Hx  Since last visit ciontinues sertraline to 50mg QHS. Anxiety better controlled.    Since last visit, Continues taking stalevo 4x a day  ONtime is near constant but at times wears off 30 mins early  She has a typical 3-4 hour ONtime where hand tremors get better and walking improves. Tremor is by far worse than walking difficulties  Takes Stalevo 25/100/200 - 8:00AM, 12PM, 4PM, 8PM  Mild dyskinesias in arms noted  Added carbidopa 25mg at each dose and now nausea is rare  Mild OFFtime dystonia R foot.  No cane or walker required. No falls.    Does note dizziness on standing at times.   Continues midodrine 2.5mg PO TID    No falls  Does not exercise  No dizziness, hallucinations    Continues Azilect 1mg QHS    PD Review of Symptoms:  Anosmia: None  Dysarthria/Hypophonia: Mild  Dysphagia/Sialorrhea: Mild dysphagia  Hallucinations: At times sees things out of the corner of her eye  Depression: Has irrational anxiety at times  Cognitive slowing: Feels memory is declining  Continues Namenda 10mg Po BID  Impulsivity: none  Obsessions/Compulsions:   -none  Urinary changes: frequency  Constipation: constipation- on miralax  Orthostasis: As above  Dyskinesia: Yes, see above  Falls: None  Freezing: None  Micrographia: Yes  Sleep issues:  -DEBBIE: None  -RBD: Yes   -Sleep Quality: Nocturia, insomnia - sits in bed and cannot "turn off mind" - started melatonin 3mg and has improved      "Prior HPI: Vanesa Rae is a R HANDED 79 y.o. female with a PMH significant for PD, diagnosed at age 68, who followed Dr. Yip, coming to establish care. At first she noticed a tremor tremor when writing on a chalk board and eating soup. Over time the tremor increased and is now present in 4 limbs R>L. This remains her most incapacitating issue. She hasn't noticed " "shuffling gait until very recently in the last year. She's noticed slight stiffness in her R shoulder for several years. Gait is fairly well preserved. She fell once due to syncope but otherwise 2 more falls total. Feels like her balance is not normal but fairly good. Easily walks several blocks despite not exercising much. No assist device. Walks around her garden for 20 minutes a day. She's noticed toe curling R>L in the last 2 months in the AM before her sinemet and sometimes at night.    In terms of orthostasis, this was a major issue for her for several years. She was having 4 syncopal spells on standing. This was determined to be medication-related. She feels like the azilect was causing the issue. At this point she no longer has orthostatic moments.    In terms of medications, tried Dopamine agonists, and then transitioned to Stavelo in the last several years.  She is currently on Stalevo 25/100/200 9am/1PM/4PM/8PM  She has intermittent OFFtime brett in the afternoon  Offtime in AM with offtime dystonia  ONtime is 3.5 hours typically    Mild dyskinesias which do not typically bother her  Takes clonopin at 8PM     No fam hx PD"    Review of Systems:   Review of Systems   Constitutional: Negative for fever.   HENT: Negative for congestion.    Eyes: Negative for double vision.   Respiratory: Negative for cough and shortness of breath.    Cardiovascular: Negative for chest pain and leg swelling.   Gastrointestinal: Negative for nausea.   Genitourinary: Positive for frequency. Negative for dysuria.   Musculoskeletal: Positive for falls.   Skin: Negative for rash.   Neurological: Positive for tremors and speech change. Negative for headaches.   Psychiatric/Behavioral: Positive for memory loss. Negative for depression.     Neuroleptic exposure:  None    Current Medications:  Outpatient Encounter Medications as of 1/6/2020   Medication Sig Dispense Refill    carbidopa (LODOSYN) 25 mg tablet Take 1 tablet (25 mg total) " by mouth 4 (four) times daily. 120 tablet 11    carbidopa (LODOSYN) 25 mg tablet       carbidopa-levodopa-entacapone -200 mg (STALEVO 100) -200 mg Tab Take 1 tablet by mouth 5 (five) times daily. 150 tablet 11    clonazePAM (KLONOPIN) 0.5 MG tablet TAKE ONE TABLET BY MOUTH EACH EVENING 30 tablet 3    cyanocobalamin, vitamin B-12, 2,500 mcg Tab Take 500 mcg by mouth.      cycloSPORINE (RESTASIS) 0.05 % ophthalmic emulsion 1 drop.      ipratropium (ATROVENT) 0.03 % nasal spray INHALE 2 SPRAYS IN EACH NOSTRIL 4 TIMES DAILY.      ipratropium (ATROVENT) 0.03 % nasal spray INHALE 2 SPRAYS IN EACH NOSTRIL 4 TIMES DAILY.      melatonin 3 mg Cap Take 3 mg by mouth.      memantine (NAMENDA) 10 MG Tab Take 1 tablet (10 mg total) by mouth 2 (two) times daily. 60 tablet 4    methylcellulose (CITRUCEL ORAL) Take by mouth every evening.      midodrine (PROAMATINE) 2.5 MG Tab Take 1 tablet (2.5 mg total) by mouth 3 (three) times daily. 90 tablet 11    polyethylene glycol (GLYCOLAX) 17 gram PwPk Take by mouth as needed.      rasagiline (AZILECT) 0.5 MG Tab Take 0.5 mg by mouth.      rasagiline (AZILECT) 0.5 MG Tab TAKE 1 TABLET (0.5 MG TOTAL) BY MOUTH ONCE DAILY. 90 tablet 1    RESTASIS 0.05 % ophthalmic emulsion       sertraline (ZOLOFT) 25 MG tablet Take 2 tablets (50 mg total) by mouth once daily. 60 tablet 11    sertraline (ZOLOFT) 25 MG tablet       [DISCONTINUED] carbidopa-levodopa-entacapone -200 mg (STALEVO 100) -200 mg Tab Take 1 tablet by mouth 5 (five) times daily. 150 tablet 11    [DISCONTINUED] memantine (NAMENDA) 10 MG Tab Take 10 mg by mouth 2 (two) times daily.       [DISCONTINUED] memantine (NAMENDA) 10 MG Tab Take 1 tablet (10 mg total) by mouth once daily. 30 tablet 4    donepezil (ARICEPT) 5 MG tablet Take 1 tablet (5 mg total) by mouth every evening. 30 tablet 11     No facility-administered encounter medications on file as of 1/6/2020.        Past Medical  "History:  No relevant PMHX    Past Surgical History:  Hysterectomy    Current Living Situation: lives with family -  has PD    Social:  Social History     Socioeconomic History    Marital status:      Spouse name: Not on file    Number of children: Not on file    Years of education: Not on file    Highest education level: Not on file   Occupational History    Not on file   Social Needs    Financial resource strain: Not on file    Food insecurity:     Worry: Not on file     Inability: Not on file    Transportation needs:     Medical: Not on file     Non-medical: Not on file   Tobacco Use    Smoking status: Never Smoker   Substance and Sexual Activity    Alcohol use: No    Drug use: No    Sexual activity: Not on file   Lifestyle    Physical activity:     Days per week: Not on file     Minutes per session: Not on file    Stress: Not on file   Relationships    Social connections:     Talks on phone: Not on file     Gets together: Not on file     Attends [employer] service: Not on file     Active member of club or organization: Not on file     Attends meetings of clubs or organizations: Not on file     Relationship status: Not on file   Other Topics Concern    Not on file   Social History Narrative    Not on file       Family History:  See above    PHYSICAL:  Sitting 94/55  BP (!) 92/46 (BP Location: Left arm, Patient Position: Sitting)   Pulse (!) 59   Ht 5' 3" (1.6 m)   Wt 50.4 kg (111 lb 1.8 oz)   BMI 19.68 kg/m²   Is OFF    General Medical Examination:  General: The patient is alert and cooperative with the exam.   HEENT: Normocephalic, atraumatic.   Neck: Supple.   Chest: Unlabored breathing.   CV: Symmetric pulses.   Ext: No clubbing, cyanosis, or edema.     Mental Status:  General: Good hygiene, appropriate appearance.  Mood/Affect: Appropriate/congruent.  Level of consciousness: Awake, alert.  Orientation: Oriented to person, place, time and situation.  Language: No " Dysarthria  Remembers 1/3 at 5 mins    Cranial nerves:  I: Not tested  II: PERRL, VFF to counting  III, IV, VI: EOMI with conjugate gaze and no nystagmus on end gaze  V: Facial sensation intact and symmetric over the bilateral V1-V3  VII: Facial muscle activation intact and symmetric over the bilateral upper and lower face  VIII: Hearing intact in the b/l ears and symmetrical to finger rub  IX, X, XII: TUP midline  X: SCMs and shoulder shrug full strength b/l and symmetric    Motor:   Strength Intact throughout upper and lower extremities, 5/5.  Fasciculations/Atrophy: None  No dyskinesias in shoulders  Moderate to severe R foot tremor at rest    DTRs:  ? Biceps Triceps Brachioradialis Knee Ankle   Left 2+ 2+ 2+ 2+ 0   Right 2+ 2+ 2+ 2+ 0       ? Finger taps Finger flicks HERMAN Heel taps   Left 2+ 2+ 2+ 1+   Right 1+ 1+ 1+ 2+   Neck tone: 1+  ? Arm Leg   Left 1+ 0   Right 1+ 0     Sensation:   -Light touch: Intact and symmetric in the bilateral upper and lower extremities.  -Temp: Intact and symmetric in the bilateral upper and lower extremities.  -Vibration: Intact and symmetric in the bilateral upper and lower extremities.  -Pin prick: Intact and symmetric in the bilateral upper and lower extremities.  -Proprioception: Intact and symmetric in the bilateral upper and lower extremities.    Coordination:   -Finger to nose: nl    Gait:  -Arises from chair without use of hands.  -Casual gait is: narrow based  -Turnin-step  No shuffle  Cannot tandem     Laboratory Data:  NA    Imaging:  NA    Assessment//Plan: Vanesa Rae is a R HANDED 79 y.o. female with a PMH significant for PD, diagnosed at age 68, who followed Dr. Yip, coming for PD care. She has improved.    Problem List Items Addressed This Visit        Neuro    Parkinson disease    Current Assessment & Plan     Tremor-predominate PD. Well controlled on stalevo 25/100/200 Q4H. Continue carbidopa 25mg Q3.5-4H to help decrease nausea.  We have not yet tried  amantadine for dyskinesias but holding off due to memory.  Continue Azilect 1mg QHS.  Suggested being more active.         Memory loss    Current Assessment & Plan     Mild to moderate  Memory loss of the years consistent with PDD. Continues namenda 10mg PO BID. Suggested Aricept 5mg QHS            Cardiac/Vascular    Orthostasis    Current Assessment & Plan     Orthostasis better, per patient. Suggested wearing an abdominal binder and CONTINUING midodrine 2.5mg Po TID. She knows not to lie down directly after mididrine.             Follow-up: 3 months  Discussed the importance of ongoing exercise in efforts to improve mobility and balance.    Serina Best MD, MS  [facility]  Department of Neurology  Movement Disorders

## 2020-02-14 RX ORDER — CLONAZEPAM 0.5 MG/1
TABLET ORAL
Qty: 30 TABLET | Refills: 5 | Status: SHIPPED | OUTPATIENT
Start: 2020-02-14 | End: 2020-02-18

## 2020-02-14 RX ORDER — CARBIDOPA 25 MG/1
TABLET ORAL
Qty: 120 TABLET | Refills: 12 | Status: SHIPPED | OUTPATIENT
Start: 2020-02-14 | End: 2021-02-17

## 2020-02-17 NOTE — TELEPHONE ENCOUNTER
----- Message from Edna Monk sent at 2/17/2020  3:29 PM CST -----  [phone]  States they didn't get medication for clonazePAM (KLONOPIN) 0.5 MG tablet   Pt is at the pharmacy waiting please send medication over     [facility] - [place], [place] - [address]

## 2020-02-18 RX ORDER — CLONAZEPAM 0.5 MG/1
TABLET ORAL
Qty: 30 TABLET | Refills: 5 | Status: SHIPPED | OUTPATIENT
Start: 2020-02-18 | End: 2020-08-10

## 2020-03-25 ENCOUNTER — TELEPHONE (OUTPATIENT)
Dept: NEUROLOGY | Facility: CLINIC | Age: 80
End: 2020-03-25

## 2020-03-25 NOTE — TELEPHONE ENCOUNTER
Spoke with  Kyra, she was informed we have received the prior auth from insurance company regarding carbidopa, it will be taken care of, once completed and faxed back to insurance company depending on insurance it can take about a week to hear a response from the insurance company.  Kyra voiced understanding

## 2020-03-25 NOTE — TELEPHONE ENCOUNTER
----- Message from Cooper Centeno sent at 3/25/2020  1:21 PM CDT -----  Contact: Pt.[phone]  The patient would like to speak to someone regarding her medication cost. Please contact the patient to discuss further.

## 2020-03-30 ENCOUNTER — PATIENT MESSAGE (OUTPATIENT)
Dept: NEUROLOGY | Facility: CLINIC | Age: 80
End: 2020-03-30

## 2020-04-03 ENCOUNTER — TELEPHONE (OUTPATIENT)
Dept: NEUROLOGY | Facility: CLINIC | Age: 80
End: 2020-04-03

## 2020-04-03 NOTE — TELEPHONE ENCOUNTER
----- Message from Ruma Batista sent at 4/3/2020  1:24 PM CDT -----  Contact: pt @ [phone] or cell  [phone]  Asking to speak with someone in Dr. Best's office regarding her medication approval with her insurance. Please call.

## 2020-04-06 ENCOUNTER — TELEPHONE (OUTPATIENT)
Dept: NEUROLOGY | Facility: CLINIC | Age: 80
End: 2020-04-06

## 2020-04-06 ENCOUNTER — OFFICE VISIT (OUTPATIENT)
Dept: NEUROLOGY | Facility: CLINIC | Age: 80
End: 2020-04-06
Payer: MEDICARE

## 2020-04-06 DIAGNOSIS — I95.1 ORTHOSTASIS: ICD-10-CM

## 2020-04-06 DIAGNOSIS — G20.A1 PARKINSON DISEASE: ICD-10-CM

## 2020-04-06 DIAGNOSIS — R41.3 MEMORY LOSS: ICD-10-CM

## 2020-04-06 PROCEDURE — 99215 OFFICE O/P EST HI 40 MIN: CPT | Mod: 95,,, | Performed by: PSYCHIATRY & NEUROLOGY

## 2020-04-06 PROCEDURE — 99215 PR OFFICE/OUTPT VISIT, EST, LEVL V, 40-54 MIN: ICD-10-PCS | Mod: 95,,, | Performed by: PSYCHIATRY & NEUROLOGY

## 2020-04-06 RX ORDER — CARBIDOPA, LEVODOPA AND ENTACAPONE 25; 200; 100 MG/1; MG/1; MG/1
1 TABLET, FILM COATED ORAL
Qty: 150 TABLET | Refills: 11 | Status: SHIPPED | OUTPATIENT
Start: 2020-04-06 | End: 2021-01-11

## 2020-04-06 RX ORDER — MIDODRINE HYDROCHLORIDE 2.5 MG/1
5 TABLET ORAL 3 TIMES DAILY
Qty: 180 TABLET | Refills: 11 | Status: SHIPPED | OUTPATIENT
Start: 2020-04-06 | End: 2020-05-15 | Stop reason: SDUPTHER

## 2020-04-06 RX ORDER — RASAGILINE 1 MG/1
1 TABLET ORAL NIGHTLY
Qty: 90 TABLET | Refills: 12 | Status: SHIPPED | OUTPATIENT
Start: 2020-04-06 | End: 2021-04-06

## 2020-04-06 NOTE — PROGRESS NOTES
The patient location is: home  The chief complaint leading to consultation is: PD  Visit type: Virtual visit with synchronous audio and video  Total time spent with patient: 20 mins  Each patient to whom he or she provides medical services by telemedicine is:  (1) informed of the relationship between the physician and patient and the respective role of any other health care provider with respect to management of the patient; and (2) notified that he or she may decline to receive medical services by telemedicine and may withdraw from such care at any time.    Notes: below      MOVEMENT DISORDERS [facility] Followup    PCP/Referring Provider: No referring provider defined for this encounter.  Date of Service: 4/6/2020    Chief Complaint: PD care    Interval Hx  Since last visit ciontinues sertraline to 50mg QHS. Anxiety better controlled.    Since last visit, Continues taking stalevo 1 tab 4x a day  ONtime is near constant but at times wears off 30 mins early  She has a typical 3-4 hour ONtime where hand tremors get better and walking improves. Tremor is by far worse than walking difficulties  Takes Stalevo 25/100/200 - 8:00AM, 12PM, 4PM, 8PM  Mild dyskinesias in arms noted  Added carbidopa 25mg at each dose and now nausea is rare  Mild OFFtime dystonia R foot.  No cane or walker required. No falls.    Does note dizziness on standing at times.   Continues midodrine 2.5mg PO BID-TID - no dizziness, passing out spells stopped  Bps typically running 104/61 even with kidodrine    No falls  Does not exercise  No dizziness, hallucinations    Continues Azilect 1mg QHS    PD Review of Symptoms:  Anosmia: None  Dysarthria/Hypophonia: Mild  Dysphagia/Sialorrhea: Mild dysphagia  Hallucinations: At times sees things out of the corner of her eye  Depression: Has irrational anxiety at times  Cognitive slowing: Feels memory is declining  Continues Namenda 10mg Po BID  Impulsivity: none  Obsessions/Compulsions:   -none  Urinary changes:  "frequency  Constipation: constipation- on miralax  Orthostasis: As above  Dyskinesia: Yes, see above  Falls: None  Freezing: None  Micrographia: Yes  Sleep issues:  -DEBBIE: None  -RBD: Yes   -Sleep Quality: Nocturia, insomnia - sits in bed and cannot "turn off mind" - started melatonin 3mg and has improved      "Prior HPI: Vanesa Rae is a R HANDED 80 y.o. female with a PMH significant for PD, diagnosed at age 68, who followed Dr. Yip, coming to [facility] care. At first she noticed a tremor tremor when writing on a chalk board and eating soup. Over time the tremor increased and is now present in 4 limbs R>L. This remains her most incapacitating issue. She hasn't noticed shuffling gait until very recently in the last year. She's noticed slight stiffness in her R shoulder for several years. Gait is fairly well preserved. She fell once due to syncope but otherwise 2 more falls total. Feels like her balance is not normal but fairly good. Easily walks several blocks despite not exercising much. No assist device. Walks around her garden for 20 minutes a day. She's noticed toe curling R>L in the last 2 months in the AM before her sinemet and sometimes at night.    In terms of orthostasis, this was a major issue for her for several years. She was having 4 syncopal spells on standing. This was determined to be medication-related. She feels like the azilect was causing the issue. At this point she no longer has orthostatic moments.    In terms of medications, tried Dopamine agonists, and then transitioned to Stavelo in the last several years.  She is currently on Stalevo 25/100/200 9am/1PM/4PM/8PM  She has intermittent OFFtime brett in the afternoon  Offtime in AM with offtime dystonia  ONtime is 3.5 hours typically    Mild dyskinesias which do not typically bother her  Takes clonopin at 8PM     No fam hx PD"    Review of Systems:   Review of Systems   Constitutional: Negative for fever.   HENT: Negative for congestion.  "   Eyes: Negative for double vision.   Respiratory: Negative for cough and shortness of breath.    Cardiovascular: Negative for chest pain and leg swelling.   Gastrointestinal: Negative for nausea.   Genitourinary: Positive for frequency. Negative for dysuria.   Musculoskeletal: Positive for falls.   Skin: Negative for rash.   Neurological: Positive for tremors and speech change. Negative for headaches.   Psychiatric/Behavioral: Positive for memory loss. Negative for depression.     Neuroleptic exposure:  None    Current Medications:  Outpatient Encounter Medications as of 4/6/2020   Medication Sig Dispense Refill    carbidopa (LODOSYN) 25 mg tablet TAKE 1 TABLET (25 MG TOTAL) BY MOUTH 4 (FOUR) TIMES DAILY. 120 tablet 12    carbidopa-levodopa-entacapone -200 mg (STALEVO 100) -200 mg Tab Take 1 tablet by mouth 5 (five) times daily. 150 tablet 11    clonazePAM (KLONOPIN) 0.5 MG tablet TAKE ONE TABLET BY MOUTH EACH EVENING 30 tablet 5    cyanocobalamin, vitamin B-12, 2,500 mcg Tab Take 500 mcg by mouth.      cycloSPORINE (RESTASIS) 0.05 % ophthalmic emulsion 1 drop.      donepezil (ARICEPT) 5 MG tablet Take 1 tablet (5 mg total) by mouth every evening. 30 tablet 11    ipratropium (ATROVENT) 0.03 % nasal spray INHALE 2 SPRAYS IN EACH NOSTRIL 4 TIMES DAILY.      ipratropium (ATROVENT) 0.03 % nasal spray INHALE 2 SPRAYS IN EACH NOSTRIL 4 TIMES DAILY.      melatonin 3 mg Cap Take 3 mg by mouth.      memantine (NAMENDA) 10 MG Tab Take 1 tablet (10 mg total) by mouth 2 (two) times daily. 60 tablet 4    methylcellulose (CITRUCEL ORAL) Take by mouth every evening.      midodrine (PROAMATINE) 2.5 MG Tab Take 2 tablets (5 mg total) by mouth 3 (three) times daily. 180 tablet 11    polyethylene glycol (GLYCOLAX) 17 gram PwPk Take by mouth as needed.      rasagiline (AZILECT) 0.5 MG Tab Take 0.5 mg by mouth.      rasagiline (AZILECT) 0.5 MG Tab TAKE 1 TABLET (0.5 MG TOTAL) BY MOUTH ONCE DAILY. 90 tablet 1     rasagiline (AZILECT) 1 mg Tab Take 1 tablet (1 mg total) by mouth every evening. 90 tablet 12    RESTASIS 0.05 % ophthalmic emulsion       sertraline (ZOLOFT) 25 MG tablet Take 2 tablets (50 mg total) by mouth once daily. 60 tablet 11    sertraline (ZOLOFT) 25 MG tablet       [DISCONTINUED] carbidopa-levodopa-entacapone -200 mg (STALEVO 100) -200 mg Tab Take 1 tablet by mouth 5 (five) times daily. 150 tablet 11    [DISCONTINUED] midodrine (PROAMATINE) 2.5 MG Tab Take 1 tablet (2.5 mg total) by mouth 3 (three) times daily. 90 tablet 11     No facility-administered encounter medications on file as of 4/6/2020.        Past Medical History:  No relevant PMHX    Past Surgical History:  Hysterectomy    Current Living Situation: lives with family -  has PD    Social:  Social History     Socioeconomic History    Marital status:      Spouse name: Not on file    Number of children: Not on file    Years of education: Not on file    Highest education level: Not on file   Occupational History    Not on file   Social Needs    Financial resource strain: Not on file    Food insecurity:     Worry: Not on file     Inability: Not on file    Transportation needs:     Medical: Not on file     Non-medical: Not on file   Tobacco Use    Smoking status: Never Smoker   Substance and Sexual Activity    Alcohol use: No    Drug use: No    Sexual activity: Not on file   Lifestyle    Physical activity:     Days per week: Not on file     Minutes per session: Not on file    Stress: Not on file   Relationships    Social connections:     Talks on phone: Not on file     Gets together: Not on file     Attends Congregation service: Not on file     Active member of club or organization: Not on file     Attends meetings of clubs or organizations: Not on file     Relationship status: Not on file   Other Topics Concern    Not on file   Social History Narrative    Not on file       Family History:  See  above    PHYSICAL:    Physical Exam  Constitutional: Well-developed, well-nourished, appears stated age  Eyes: No scleral icterus  ENT: Moist oral mucosa  Cardiovascular: No lower extremity edema   Respiratory: No labored breathing   Skin: No rash   Hematologic: No bruising  Psychiatric: No depression  Other: GI/ deferred   · Mental status: Alert and oriented to person, place, time, and situation;   · WORLD-DLROW; 5/5à 5/5 words, follows commands  · Speech: normal (not dysarthric), no aphasia  · Cranial nerves:            · CN II: Pupils mid-position and equal, not tested light or accommodation  · CN III, IV, VI: Extraocular movements full, no nystagmus visualized  · CN V: Not tested   · CN VII: Face strong and symmetric bilaterally   · CN VIII: Hearing intact to voice and conversation   · CN IX, X: Palate raises midline and symmetric   · CN XI: Strong shoulder shrug B   · CN XII: Tongue appears midline   · Motor: Normal bulk by appearance, no drift   · Sensory: Not tested    · Gait: Not tested  · Deep tendon reflexes: Not tested  · Movement/Coordination                    mild hypophonic speech.                     mod facial masking.    Bradykinesia  ? Finger taps Finger flicks HERMAN Heel taps   Left 2+ - - -   Right 3+ - - -       Tremor Exam  Mild 1+ resting tremor bilat hands        Laboratory Data:  NA    Imaging:  NA    Assessment//Plan:    Problem List Items Addressed This Visit        Neuro    Parkinson disease    Current Assessment & Plan     Tremor-predominate PD. Well controlled on stalevo 25/100/200 Q4H. Continue carbidopa 25mg Q3.5-4H to help decrease nausea.  We have not yet tried amantadine for dyskinesias but holding off due to memory.  Increase Azilect to 1mg QHS.  Suggested being more active.         Memory loss    Current Assessment & Plan     Mild to moderate  Memory loss of the years consistent with PDD. Continues namenda 10mg PO BID. Suggested Aricept 5mg QHS but she could not tolerate due to  nausea.            Cardiac/Vascular    Orthostasis    Current Assessment & Plan     Orthostasis better, per patient. However still has low Bps and tunnel vision at times. Suggested wearing an abdominal binder and increase midodrine from 2.5mg Po TID to 5mg PO TID. She knows not to lie down directly after midodrine.             Follow-up: 2 weeks  Discussed the importance of ongoing exercise in efforts to improve mobility and balance.    Serina Best MD, MS [facility]  Department of Neurology  Movement Disorders

## 2020-04-06 NOTE — ASSESSMENT & PLAN NOTE
Tremor-predominate PD. Well controlled on stalevo 25/100/200 Q4H. Continue carbidopa 25mg Q3.5-4H to help decrease nausea.  We have not yet tried amantadine for dyskinesias but holding off due to memory.  Increase Azilect to 1mg QHS.  Suggested being more active.

## 2020-04-06 NOTE — ASSESSMENT & PLAN NOTE
Orthostasis better, per patient. However still has low Bps and tunnel vision at times. Suggested wearing an abdominal binder and increase midodrine from 2.5mg Po TID to 5mg PO TID. She knows not to lie down directly after midodrine.

## 2020-04-06 NOTE — ASSESSMENT & PLAN NOTE
Mild to moderate  Memory loss of the years consistent with PDD. Continues namenda 10mg PO BID. Suggested Aricept 5mg QHS but she could not tolerate due to nausea.

## 2020-04-22 RX ORDER — SERTRALINE HYDROCHLORIDE 25 MG/1
50 TABLET, FILM COATED ORAL DAILY
Qty: 60 TABLET | Refills: 11 | Status: SHIPPED | OUTPATIENT
Start: 2020-04-22 | End: 2021-04-22

## 2020-04-30 ENCOUNTER — OFFICE VISIT (OUTPATIENT)
Dept: NEUROLOGY | Facility: CLINIC | Age: 80
End: 2020-04-30
Payer: MEDICARE

## 2020-04-30 DIAGNOSIS — I95.1 ORTHOSTASIS: ICD-10-CM

## 2020-04-30 DIAGNOSIS — R41.3 MEMORY LOSS: ICD-10-CM

## 2020-04-30 DIAGNOSIS — G20.A1 PARKINSON DISEASE: ICD-10-CM

## 2020-04-30 PROCEDURE — 99214 PR OFFICE/OUTPT VISIT, EST, LEVL IV, 30-39 MIN: ICD-10-PCS | Mod: 95,,, | Performed by: PSYCHIATRY & NEUROLOGY

## 2020-04-30 PROCEDURE — 99214 OFFICE O/P EST MOD 30 MIN: CPT | Mod: 95,,, | Performed by: PSYCHIATRY & NEUROLOGY

## 2020-04-30 NOTE — ASSESSMENT & PLAN NOTE
Orthostasis better, per patient. However still has low Bps and tunnel vision at times - she at times misses doses. Suggested wearing an abdominal binder 5mg PO TID. She knows not to lie down directly after midodrine. Suggested a pillbox for compliance.

## 2020-04-30 NOTE — PROGRESS NOTES
The patient location is: home  The chief complaint leading to consultation is: PD  Visit type: Virtual visit with synchronous audio and video  Total time spent with patient: 20 mins  Each patient to whom he or she provides medical services by telemedicine is:  (1) informed of the relationship between the physician and patient and the respective role of any other health care provider with respect to management of the patient; and (2) notified that he or she may decline to receive medical services by telemedicine and may withdraw from such care at any time.    Notes: below      MOVEMENT DISORDERS CLINIC Followup    PCP/Referring Provider: No referring provider defined for this encounter.  Date of Service: 4/30/2020    Chief Complaint: PD care    Interval Hx  Since last visit continues sertraline to 50mg QHS. Anxiety better controlled.    Since last visit, Continues taking stalevo 1 tab 4x a day  Takes Stalevo 25/100/200 - 8:00AM, 12PM, 4PM, 8PM  ONtime is near constant but at times wears off 30 mins early  She has a typical 3-4 hour ONtime where hand tremors get better and walking improves. Tremor is by far worse than walking difficulties  Mild dyskinesias in arms noted  Added carbidopa 25mg at each dose and now nausea is rare  Mild OFFtime dystonia R foot.  No cane or walker required. No falls.      Does note dizziness on standing at times.   Continues midodrine 5mg PO BID-TID - no dizziness, passing out spells stopped - but forgets this    No falls  Does not exercise  No dizziness, hallucinations    Continues Azilect 1mg QHS    Med hx  Could not tolerate donepezil 5mg due to nausea    PD Review of Symptoms:  Anosmia: None  Dysarthria/Hypophonia: Mild  Dysphagia/Sialorrhea: Mild dysphagia  Hallucinations: At times sees things out of the corner of her eye  Depression: Has irrational anxiety at times  Cognitive slowing: Feels memory is declining  Continues Namenda 10mg Po BID  Impulsivity: none  Obsessions/Compulsions:  "  -none  Urinary changes: frequency  Constipation: constipation- on miralax  Orthostasis: As above  Dyskinesia: Yes, see above  Falls: when she doesn't take midodrine  Freezing: None  Micrographia: Yes  Sleep issues:  -DEBBIE: None  -RBD: Yes   -Sleep Quality: Nocturia, insomnia - sits in bed and cannot "turn off mind" - started melatonin 3mg and has improved      "Prior HPI: Vanesa Rae is a R HANDED 80 y.o. female with a PMH significant for PD, diagnosed at age 68, who followed Dr. Yip, coming to [facility] care. At first she noticed a tremor tremor when writing on a chalk board and eating soup. Over time the tremor increased and is now present in 4 limbs R>L. This remains her most incapacitating issue. She hasn't noticed shuffling gait until very recently in the last year. She's noticed slight stiffness in her R shoulder for several years. Gait is fairly well preserved. She fell once due to syncope but otherwise 2 more falls total. Feels like her balance is not normal but fairly good. Easily walks several blocks despite not exercising much. No assist device. Walks around her garden for 20 minutes a day. She's noticed toe curling R>L in the last 2 months in the AM before her sinemet and sometimes at night.    In terms of orthostasis, this was a major issue for her for several years. She was having 4 syncopal spells on standing. This was determined to be medication-related. She feels like the azilect was causing the issue. At this point she no longer has orthostatic moments.    In terms of medications, tried Dopamine agonists, and then transitioned to Stavelo in the last several years.  She is currently on Stalevo 25/100/200 9am/1PM/4PM/8PM  She has intermittent OFFtime brett in the afternoon  Offtime in AM with offtime dystonia  ONtime is 3.5 hours typically    Mild dyskinesias which do not typically bother her  Takes clonopin at 8PM     No fam hx PD"    Review of Systems:   Review of Systems   Constitutional: " Negative for fever.   HENT: Negative for congestion.    Eyes: Negative for double vision.   Respiratory: Negative for cough and shortness of breath.    Cardiovascular: Negative for chest pain and leg swelling.   Gastrointestinal: Negative for nausea.   Genitourinary: Positive for frequency. Negative for dysuria.   Musculoskeletal: Positive for falls.   Skin: Negative for rash.   Neurological: Positive for tremors and speech change. Negative for headaches.   Psychiatric/Behavioral: Positive for memory loss. Negative for depression.     Neuroleptic exposure:  None    Current Medications:  Outpatient Encounter Medications as of 4/30/2020   Medication Sig Dispense Refill    carbidopa (LODOSYN) 25 mg tablet TAKE 1 TABLET (25 MG TOTAL) BY MOUTH 4 (FOUR) TIMES DAILY. 120 tablet 12    carbidopa-levodopa-entacapone -200 mg (STALEVO 100) -200 mg Tab Take 1 tablet by mouth 5 (five) times daily. 150 tablet 11    clonazePAM (KLONOPIN) 0.5 MG tablet TAKE ONE TABLET BY MOUTH EACH EVENING 30 tablet 5    cyanocobalamin, vitamin B-12, 2,500 mcg Tab Take 500 mcg by mouth.      cycloSPORINE (RESTASIS) 0.05 % ophthalmic emulsion 1 drop.      donepezil (ARICEPT) 5 MG tablet Take 1 tablet (5 mg total) by mouth every evening. 30 tablet 11    ipratropium (ATROVENT) 0.03 % nasal spray INHALE 2 SPRAYS IN EACH NOSTRIL 4 TIMES DAILY.      ipratropium (ATROVENT) 0.03 % nasal spray INHALE 2 SPRAYS IN EACH NOSTRIL 4 TIMES DAILY.      melatonin 3 mg Cap Take 3 mg by mouth.      memantine (NAMENDA) 10 MG Tab Take 1 tablet (10 mg total) by mouth 2 (two) times daily. 60 tablet 4    methylcellulose (CITRUCEL ORAL) Take by mouth every evening.      midodrine (PROAMATINE) 2.5 MG Tab Take 2 tablets (5 mg total) by mouth 3 (three) times daily. 180 tablet 11    polyethylene glycol (GLYCOLAX) 17 gram PwPk Take by mouth as needed.      rasagiline (AZILECT) 0.5 MG Tab Take 0.5 mg by mouth.      rasagiline (AZILECT) 1 mg Tab Take 1  tablet (1 mg total) by mouth every evening. 90 tablet 12    RESTASIS 0.05 % ophthalmic emulsion       sertraline (ZOLOFT) 25 MG tablet       sertraline (ZOLOFT) 25 MG tablet TAKE 2 TABLETS (50 MG TOTAL) BY MOUTH ONCE DAILY. 60 tablet 11     No facility-administered encounter medications on file as of 4/30/2020.        Past Medical History:  No relevant PMHX    Past Surgical History:  Hysterectomy    Current Living Situation: lives with family -  has PD    Social:  Social History     Socioeconomic History    Marital status:      Spouse name: Not on file    Number of children: Not on file    Years of education: Not on file    Highest education level: Not on file   Occupational History    Not on file   Social Needs    Financial resource strain: Not on file    Food insecurity:     Worry: Not on file     Inability: Not on file    Transportation needs:     Medical: Not on file     Non-medical: Not on file   Tobacco Use    Smoking status: Never Smoker   Substance and Sexual Activity    Alcohol use: No    Drug use: No    Sexual activity: Not on file   Lifestyle    Physical activity:     Days per week: Not on file     Minutes per session: Not on file    Stress: Not on file   Relationships    Social connections:     Talks on phone: Not on file     Gets together: Not on file     Attends Bahai service: Not on file     Active member of club or organization: Not on file     Attends meetings of clubs or organizations: Not on file     Relationship status: Not on file   Other Topics Concern    Not on file   Social History Narrative    Not on file       Family History:  See above    PHYSICAL:    Physical Exam  Constitutional: Well-developed, well-nourished, appears stated age  Eyes: No scleral icterus  ENT: Moist oral mucosa  Cardiovascular: No lower extremity edema   Respiratory: No labored breathing   Skin: No rash   Hematologic: No bruising  Psychiatric: No depression  Other: GI/ deferred    · Mental status: Alert and oriented to person, place, time, and situation;   · Speech: normal (not dysarthric), no aphasia  · Cranial nerves:            · CN II: Pupils mid-position and equal, not tested light or accommodation  · CN III, IV, VI: Extraocular movements full, no nystagmus visualized  · CN V: Not tested   · CN VII: Face strong and symmetric bilaterally   · CN VIII: Hearing intact to voice and conversation   · CN IX, X: Palate raises midline and symmetric   · CN XI: Strong shoulder shrug B   · CN XII: Tongue appears midline   · Motor: Normal bulk by appearance, no drift   · Sensory: Not tested    · Gait: Not tested  · Deep tendon reflexes: Not tested  · Movement/Coordination                    mild hypophonic speech.                     mod facial masking.    Bradykinesia  ? Finger taps Finger flicks HERMAN Heel taps   Left 2+ - - -   Right 2+ - - -       Tremor Exam  Mild 1+ resting tremor bilat hands        Laboratory Data:  NA    Imaging:  NA    Assessment//Plan:    Problem List Items Addressed This Visit        Neuro    Parkinson disease    Current Assessment & Plan     Tremor-predominate PD. Well controlled on stalevo 25/100/200 Q4H. Continue carbidopa 25mg Q3.5-4H to help decrease nausea.  We have not yet tried amantadine for dyskinesias but holding off due to memory.  Continue Azilect 1mg QHS.  Suggested being more active.         Memory loss    Current Assessment & Plan     Mild to moderate  Memory loss of the years consistent with PDD. Continues namenda 10mg PO BID. Suggested Aricept 5mg QHS but she could not tolerate due to nausea.            Cardiac/Vascular    Orthostasis    Current Assessment & Plan     Orthostasis better, per patient. However still has low Bps and tunnel vision at times - she at times misses doses. Suggested wearing an abdominal binder 5mg PO TID. She knows not to lie down directly after midodrine. Suggested a pillbox for compliance.             Follow-up: 2  weeks  Discussed the importance of ongoing exercise in efforts to improve mobility and balance.    Serina Best MD, MS  [facility]  Department of Neurology  Movement Disorders

## 2020-05-15 RX ORDER — MIDODRINE HYDROCHLORIDE 2.5 MG/1
5 TABLET ORAL 3 TIMES DAILY
Qty: 180 TABLET | Refills: 11 | Status: SHIPPED | OUTPATIENT
Start: 2020-05-15 | End: 2020-11-09

## 2020-05-15 NOTE — TELEPHONE ENCOUNTER
----- Message from Kyara Jacobs sent at 5/15/2020  2:12 PM CDT -----  Contact: self  Pt is requesting a refill today.     midodrine (PROAMATINE) 2.5 MG Tab    Contact info [phone]

## 2020-08-06 ENCOUNTER — OFFICE VISIT (OUTPATIENT)
Dept: NEUROLOGY | Facility: CLINIC | Age: 80
End: 2020-08-06
Payer: MEDICARE

## 2020-08-06 DIAGNOSIS — G20.A1 PARKINSON DISEASE: ICD-10-CM

## 2020-08-06 DIAGNOSIS — R41.3 MEMORY LOSS: ICD-10-CM

## 2020-08-06 PROCEDURE — 99215 PR OFFICE/OUTPT VISIT, EST, LEVL V, 40-54 MIN: ICD-10-PCS | Mod: 95,,, | Performed by: PSYCHIATRY & NEUROLOGY

## 2020-08-06 PROCEDURE — 99215 OFFICE O/P EST HI 40 MIN: CPT | Mod: 95,,, | Performed by: PSYCHIATRY & NEUROLOGY

## 2020-08-06 NOTE — PROGRESS NOTES
The patient location is: home  The chief complaint leading to consultation is: PD  Visit type: Virtual visit with synchronous audio and video  Total time spent with patient: 20 mins  Each patient to whom he or she provides medical services by telemedicine is:  (1) informed of the relationship between the physician and patient and the respective role of any other health care provider with respect to management of the patient; and (2) notified that he or she may decline to receive medical services by telemedicine and may withdraw from such care at any time.    Notes: below      [facility] DISORD[facility] Followup    PCP/Referring Provider: No referring provider defined for this encounter.  Date of Service: 8/6/2020    Chief Complaint: PD care    Interval Hx    Tremor-predominate PD. Well controlled on stalevo 25/100/200 Q4H. ONtime is 3.5 hours. Continue carbidopa 25mg Q3.5-4H to help decrease nausea.  We have not yet tried amantadine for dyskinesias but holding off due to memory.    Continues midodrine 5mg PO TID  Decreased azilect to 0.5mg and BP normalized  Suggested being more active.    At times gets throat-squeezing sensations - unclear if during OFFtimes  Since last visit continues sertraline to 50mg QHS. Anxiety better controlled.    Mild dyskinesias in arms noted  Occasional falls  Does not exercise  No dizziness, hallucinations    Med hx  Could not tolerate donepezil 5mg due to nausea    PD Review of Symptoms:  Anosmia: None  Dysarthria/Hypophonia: Mild  Dysphagia/Sialorrhea: Mild dysphagia  Hallucinations: At times sees things out of the corner of her eye  Depression: Has irrational anxiety at times  Cognitive slowing: Feels memory is declining - on memantine 10mg PO BID  Continues Namenda 10mg Po BID  Impulsivity: none  Obsessions/Compulsions:   -none  Urinary changes: frequency  Constipation: constipation- on miralax  Orthostasis: As above  Dyskinesia: Yes, see above  Falls: when she doesn't take  "midodrine  Freezing: None  Micrographia: Yes  Sleep issues:  -DEBBIE: None  -RBD: Yes   -Sleep Quality: Nocturia, insomnia - sits in bed and cannot "turn off mind" - started melatonin 3mg and has improved      "Prior HPI: Vanesa Rae is a R HANDED 80 y.o. female with a PMH significant for PD, diagnosed at age 68, who followed Dr. Yip, coming to establish care. At first she noticed a tremor tremor when writing on a chalk board and eating soup. Over time the tremor increased and is now present in 4 limbs R>L. This remains her most incapacitating issue. She hasn't noticed shuffling gait until very recently in the last year. She's noticed slight stiffness in her R shoulder for several years. Gait is fairly well preserved. She fell once due to syncope but otherwise 2 more falls total. Feels like her balance is not normal but fairly good. Easily walks several blocks despite not exercising much. No assist device. Walks around her garden for 20 minutes a day. She's noticed toe curling R>L in the last 2 months in the AM before her sinemet and sometimes at night.    In terms of orthostasis, this was a major issue for her for several years. She was having 4 syncopal spells on standing. This was determined to be medication-related. She feels like the azilect was causing the issue. At this point she no longer has orthostatic moments.    In terms of medications, tried Dopamine agonists, and then transitioned to Stavelo in the last several years.  She is currently on Stalevo 25/100/200 9am/1PM/4PM/8PM  She has intermittent OFFtime brett in the afternoon  Offtime in AM with offtime dystonia  ONtime is 3.5 hours typically    Mild dyskinesias which do not typically bother her  Takes clonopin at 8PM     No fam hx PD"    Review of Systems:   Review of Systems   Constitutional: Negative for fever.   HENT: Negative for congestion.    Eyes: Negative for double vision.   Respiratory: Negative for cough and shortness of breath.  "   Cardiovascular: Negative for chest pain and leg swelling.   Gastrointestinal: Negative for nausea.   Genitourinary: Positive for frequency. Negative for dysuria.   Musculoskeletal: Positive for falls.   Skin: Negative for rash.   Neurological: Positive for tremors and speech change. Negative for headaches.   Psychiatric/Behavioral: Positive for memory loss. Negative for depression.     Neuroleptic exposure:  None    Current Medications:  Outpatient Encounter Medications as of 8/6/2020   Medication Sig Dispense Refill    carbidopa (LODOSYN) 25 mg tablet TAKE 1 TABLET (25 MG TOTAL) BY MOUTH 4 (FOUR) TIMES DAILY. 120 tablet 12    carbidopa-levodopa-entacapone -200 mg (STALEVO 100) -200 mg Tab Take 1 tablet by mouth 5 (five) times daily. 150 tablet 11    clonazePAM (KLONOPIN) 0.5 MG tablet TAKE ONE TABLET BY MOUTH EACH EVENING 30 tablet 5    cyanocobalamin, vitamin B-12, 2,500 mcg Tab Take 500 mcg by mouth.      cycloSPORINE (RESTASIS) 0.05 % ophthalmic emulsion 1 drop.      donepezil (ARICEPT) 5 MG tablet Take 1 tablet (5 mg total) by mouth every evening. 30 tablet 11    ipratropium (ATROVENT) 0.03 % nasal spray INHALE 2 SPRAYS IN EACH NOSTRIL 4 TIMES DAILY.      ipratropium (ATROVENT) 0.03 % nasal spray INHALE 2 SPRAYS IN EACH NOSTRIL 4 TIMES DAILY.      melatonin 3 mg Cap Take 3 mg by mouth.      memantine (NAMENDA) 10 MG Tab Take 1 tablet (10 mg total) by mouth 2 (two) times daily. 60 tablet 4    methylcellulose (CITRUCEL ORAL) Take by mouth every evening.      midodrine (PROAMATINE) 2.5 MG Tab Take 2 tablets (5 mg total) by mouth 3 (three) times daily. 180 tablet 11    polyethylene glycol (GLYCOLAX) 17 gram PwPk Take by mouth as needed.      rasagiline (AZILECT) 0.5 MG Tab TAKE 1 TABLET (0.5 MG TOTAL) BY MOUTH ONCE DAILY. 90 tablet 3    rasagiline (AZILECT) 1 mg Tab Take 1 tablet (1 mg total) by mouth every evening. 90 tablet 12    RESTASIS 0.05 % ophthalmic emulsion       sertraline  (ZOLOFT) 25 MG tablet       sertraline (ZOLOFT) 25 MG tablet TAKE 2 TABLETS (50 MG TOTAL) BY MOUTH ONCE DAILY. 60 tablet 11     No facility-administered encounter medications on file as of 8/6/2020.        Past Medical History:  No relevant PMHX    Past Surgical History:  Hysterectomy    Current Living Situation: lives with family -  has PD    Social:  Social History     Socioeconomic History    Marital status:      Spouse name: Not on file    Number of children: Not on file    Years of education: Not on file    Highest education level: Not on file   Occupational History    Not on file   Social Needs    Financial resource strain: Not on file    Food insecurity     Worry: Not on file     Inability: Not on file    Transportation needs     Medical: Not on file     Non-medical: Not on file   Tobacco Use    Smoking status: Never Smoker   Substance and Sexual Activity    Alcohol use: No    Drug use: No    Sexual activity: Not on file   Lifestyle    Physical activity     Days per week: Not on file     Minutes per session: Not on file    Stress: Not on file   Relationships    Social connections     Talks on phone: Not on file     Gets together: Not on file     Attends Advent service: Not on file     Active member of club or organization: Not on file     Attends meetings of clubs or organizations: Not on file     Relationship status: Not on file   Other Topics Concern    Not on file   Social History Narrative    Not on file       Family History:  See above    PHYSICAL:    Physical Exam  Constitutional: Well-developed, well-nourished, appears stated age  Eyes: No scleral icterus  ENT: Moist oral mucosa  Cardiovascular: No lower extremity edema   Respiratory: No labored breathing   Skin: No rash   Hematologic: No bruising  Psychiatric: No depression  Other: GI/ deferred   · Mental status: Alert and oriented to person, place, time, and situation;   · Speech: normal (not dysarthric), no  aphasia  · Cranial nerves:            · CN II: Pupils mid-position and equal, not tested light or accommodation  · CN III, IV, VI: Extraocular movements full, no nystagmus visualized  · CN V: Not tested   · CN VII: Face strong and symmetric bilaterally   · CN VIII: Hearing intact to voice and conversation   · CN IX, X: Palate raises midline and symmetric   · CN XI: Strong shoulder shrug B   · CN XII: Tongue appears midline   · Motor: Normal bulk by appearance, no drift   · Sensory: Not tested    · Gait: Not tested  · Deep tendon reflexes: Not tested  · Movement/Coordination                    mild hypophonic speech.                     mod facial masking.    Bradykinesia  ? Finger taps Finger flicks HERMAN Heel taps   Left 2+ - - -   Right 2+ - - -       Tremor Exam  Mild 1+ resting tremor bilat hands  Mild dyskinesias    Laboratory Data:  NA    Imaging:  NA    Assessment//Plan:    Problem List Items Addressed This Visit     None        Follow-up: 2 weeks  Discussed the importance of ongoing exercise in efforts to improve mobility and balance.    Serina Best MD, MS  [facility]  Department of Neurology  Movement Disorders

## 2020-08-07 NOTE — ASSESSMENT & PLAN NOTE
Tremor-predominate PD. Some orthostasis which resolved.    Continue rasagiline 0.5mg as she feels this worsens orthostasis   Foot pedals - mild dyskinesias  Continue dyeyxls23/100/200 Q4H. Continue carbidopa 25mg Q3.5-4H to help decrease nausea.

## 2020-11-09 ENCOUNTER — OFFICE VISIT (OUTPATIENT)
Dept: NEUROLOGY | Facility: CLINIC | Age: 80
End: 2020-11-09
Payer: MEDICARE

## 2020-11-09 DIAGNOSIS — I95.1 ORTHOSTASIS: ICD-10-CM

## 2020-11-09 DIAGNOSIS — G20.A1 PARKINSON DISEASE: ICD-10-CM

## 2020-11-09 DIAGNOSIS — R41.3 MEMORY LOSS: Primary | ICD-10-CM

## 2020-11-09 PROCEDURE — 99215 PR OFFICE/OUTPT VISIT, EST, LEVL V, 40-54 MIN: ICD-10-PCS | Mod: 95,,, | Performed by: PSYCHIATRY & NEUROLOGY

## 2020-11-09 PROCEDURE — 99215 OFFICE O/P EST HI 40 MIN: CPT | Mod: 95,,, | Performed by: PSYCHIATRY & NEUROLOGY

## 2020-11-09 RX ORDER — MIDODRINE HYDROCHLORIDE 5 MG/1
TABLET ORAL
Qty: 60 TABLET | Refills: 11 | Status: SHIPPED | OUTPATIENT
Start: 2020-11-09 | End: 2021-06-29

## 2020-11-09 NOTE — PROGRESS NOTES
The patient location is: home  The chief complaint leading to consultation is: PD  Visit type: Virtual visit with synchronous audio and video  Total time spent with patient: 20 mins  Each patient to whom he or she provides medical services by telemedicine is:  (1) informed of the relationship between the physician and patient and the respective role of any other health care provider with respect to management of the patient; and (2) notified that he or she may decline to receive medical services by telemedicine and may withdraw from such care at any time.    Notes: below      MOVEMENT DISORDERS CLINIC Followup    PCP/Referring Provider: No referring provider defined for this encounter.  Date of Service: 11/9/2020    Chief Complaint: PD care    Interval Hx    Since last visit,   Orthostasis better but low Bps at 5pm  Still have Bps in 90's /60's standing at times  Continues midodrine 5mg PO TID    Tremor-predominate PD. Well controlled on stalevo 25/100/200 Q4H. ONtime is 3.5 hours. Continue carbidopa 25mg Q3.5-4H to help decrease nausea.  We have not yet tried amantadine for dyskinesias but holding off due to memory.    Decreased azilect to 0.5mg (due to BP effect)    At times gets throat-squeezing sensations - unclear if during OFFtimes  Since last visit continues sertraline to 50mg QHS. Anxiety better controlled.    Mild dyskinesias in arms noted  No longer falls  Starting to do pedaling  No dizziness, hallucinations    Continues memantine 10mg BID    Med hx  Could not tolerate donepezil 5mg due to nausea    PD Review of Symptoms:  Anosmia: None  Dysarthria/Hypophonia: Mild  Dysphagia/Sialorrhea: Mild dysphagia  Hallucinations: At times sees things out of the corner of her eye  Depression: Has irrational anxiety at times  Cognitive slowing: Feels memory is declining - on memantine 10mg PO BID  Continues Namenda 10mg Po BID  Impulsivity: none  Obsessions/Compulsions:   -none  Urinary changes:  "frequency  Constipation: constipation- on miralax  Orthostasis: As above  Dyskinesia: Yes, see above  Falls: when she doesn't take midodrine  Freezing: None  Micrographia: Yes  Sleep issues:  -DEBBIE: None  -RBD: Yes   -Sleep Quality: Nocturia, insomnia - sits in bed and cannot "turn off mind" - started melatonin 3mg and has improved      "Prior HPI: Vanesa Rae is a R HANDED 80 y.o. female with a PMH significant for PD, diagnosed at age 68, who followed Dr. Yip, coming [facility]. At first she noticed a tremor tremor when writing on a chalk board and eating soup. Over time the tremor increased and is now present in 4 limbs R>L. This remains her most incapacitating issue. She hasn't noticed shuffling gait until very recently in the last year. She's noticed slight stiffness in her R shoulder for several years. Gait is fairly well preserved. She fell once due to syncope but otherwise 2 more falls total. Feels like her balance is not normal but fairly good. Easily walks several blocks despite not exercising much. No assist device. Walks around her garden for 20 minutes a day. She's noticed toe curling R>L in the last 2 months in the AM before her sinemet and sometimes at night.    In terms of orthostasis, this was a major issue for her for several years. She was having 4 syncopal spells on standing. This was determined to be medication-related. She feels like the azilect was causing the issue. At this point she no longer has orthostatic moments.    In terms of medications, tried Dopamine agonists, and then transitioned to Stavelo in the last several years.  She is currently on Stalevo 25/100/200 9am/1PM/4PM/8PM  She has intermittent OFFtime brett in the afternoon  Offtime in AM with offtime dystonia  ONtime is 3.5 hours typically    Mild dyskinesias which do not typically bother her  Takes clonopin at 8PM     No fam hx PD"    Review of Systems:   Review of Systems   Constitutional: Negative for fever.   HENT: " Negative for congestion.    Eyes: Negative for double vision.   Respiratory: Negative for cough and shortness of breath.    Cardiovascular: Negative for chest pain and leg swelling.   Gastrointestinal: Negative for nausea.   Genitourinary: Positive for frequency. Negative for dysuria.   Musculoskeletal: Positive for falls.   Skin: Negative for rash.   Neurological: Positive for tremors and speech change. Negative for headaches.   Psychiatric/Behavioral: Positive for memory loss. Negative for depression.     Neuroleptic exposure:  None    Current Medications:  Outpatient Encounter Medications as of 11/9/2020   Medication Sig Dispense Refill    carbidopa (LODOSYN) 25 mg tablet TAKE 1 TABLET (25 MG TOTAL) BY MOUTH 4 (FOUR) TIMES DAILY. 120 tablet 12    carbidopa-levodopa-entacapone -200 mg (STALEVO 100) -200 mg Tab Take 1 tablet by mouth 5 (five) times daily. 150 tablet 11    clonazePAM (KLONOPIN) 0.5 MG tablet TAKE ONE TABLET BY MOUTH EACH EVENING 30 tablet 5    cyanocobalamin, vitamin B-12, 2,500 mcg Tab Take 500 mcg by mouth.      cycloSPORINE (RESTASIS) 0.05 % ophthalmic emulsion 1 drop.      donepezil (ARICEPT) 5 MG tablet Take 1 tablet (5 mg total) by mouth every evening. 30 tablet 11    ipratropium (ATROVENT) 0.03 % nasal spray INHALE 2 SPRAYS IN EACH NOSTRIL 4 TIMES DAILY.      ipratropium (ATROVENT) 0.03 % nasal spray INHALE 2 SPRAYS IN EACH NOSTRIL 4 TIMES DAILY.      melatonin 3 mg Cap Take 3 mg by mouth.      memantine (NAMENDA) 10 MG Tab TAKE 1 TABLET (10 MG TOTAL) BY MOUTH 2 (TWO) TIMES DAILY. 60 tablet 4    methylcellulose (CITRUCEL ORAL) Take by mouth every evening.      midodrine (PROAMATINE) 2.5 MG Tab Take 2 tablets (5 mg total) by mouth 3 (three) times daily. 180 tablet 11    polyethylene glycol (GLYCOLAX) 17 gram PwPk Take by mouth as needed.      rasagiline (AZILECT) 0.5 MG Tab TAKE 1 TABLET (0.5 MG TOTAL) BY MOUTH ONCE DAILY. 90 tablet 3    rasagiline (AZILECT) 1 mg Tab  Take 1 tablet (1 mg total) by mouth every evening. 90 tablet 12    RESTASIS 0.05 % ophthalmic emulsion       sertraline (ZOLOFT) 25 MG tablet       sertraline (ZOLOFT) 25 MG tablet TAKE 2 TABLETS (50 MG TOTAL) BY MOUTH ONCE DAILY. 60 tablet 11     No facility-administered encounter medications on file as of 11/9/2020.        Past Medical History:  No relevant PMHX    Past Surgical History:  Hysterectomy    Current Living Situation: lives with family -  has PD    Social:  Social History     Socioeconomic History    Marital status:      Spouse name: Not on file    Number of children: Not on file    Years of education: Not on file    Highest education level: Not on file   Occupational History    Not on file   Social Needs    Financial resource strain: Not on file    Food insecurity     Worry: Not on file     Inability: Not on file    Transportation needs     Medical: Not on file     Non-medical: Not on file   Tobacco Use    Smoking status: Never Smoker   Substance and Sexual Activity    Alcohol use: No    Drug use: No    Sexual activity: Not on file   Lifestyle    Physical activity     Days per week: Not on file     Minutes per session: Not on file    Stress: Not on file   Relationships    Social connections     Talks on phone: Not on file     Gets together: Not on file     Attends Islam service: Not on file     Active member of club or organization: Not on file     Attends meetings of clubs or organizations: Not on file     Relationship status: Not on file   Other Topics Concern    Not on file   Social History Narrative    Not on file       Family History:  See above    PHYSICAL:    Physical Exam  Constitutional: Well-developed, well-nourished, appears stated age  Eyes: No scleral icterus  ENT: Moist oral mucosa  Cardiovascular: No lower extremity edema   Respiratory: No labored breathing   Skin: No rash   Hematologic: No bruising  Psychiatric: No depression  Other: GI/ deferred    · Mental status: Alert and oriented to person, place, time, and situation;   · Speech: normal (not dysarthric), no aphasia  · Cranial nerves:            · CN II: Pupils mid-position and equal, not tested light or accommodation  · CN III, IV, VI: Extraocular movements full, no nystagmus visualized  · CN V: Not tested   · CN VII: Face strong and symmetric bilaterally   · CN VIII: Hearing intact to voice and conversation   · CN IX, X: Palate raises midline and symmetric   · CN XI: Strong shoulder shrug B   · CN XII: Tongue appears midline   · Motor: Normal bulk by appearance, no drift   · Sensory: Not tested    · Gait: Not tested  · Deep tendon reflexes: Not tested  · Movement/Coordination                    mild hypophonic speech.                     mod facial masking.    Bradykinesia  ? Finger taps Finger flicks HERMAN Heel taps   Left 2+ - - -   Right 2+ - - -       Tremor Exam  Mild 1+ resting tremor bilat hands  Mild dyskinesias    Laboratory Data:  NA    Imaging:  NA    Assessment//Plan:    Problem List Items Addressed This Visit        Neuro    Parkinson disease    Current Assessment & Plan     Tremor-predominate PD. Some orthostasis which resolved.    Continue rasagiline 0.5mg as she feels this worsens orthostasis   Foot pedals - mild dyskinesias  Continue snwlyok56/100/200 Q4H. Continue carbidopa 25mg Q3.5-4H to help decrease nausea.           Memory loss    Current Assessment & Plan     Stopped aricept  Continue memantine 10mg PO BID             Cardiac/Vascular    Orthostasis    Current Assessment & Plan     Orthostasis better, per patient. No longer syncopizes. However still has low Bps and tunnel vision at times.. Suggested wearing an abdominal binder and midodrine 5/7.5/5mg  TID. She knows not to lie down directly after midodrine. Suggested a pillbox for compliance.             Serina Best MD, MS  [facility] [place]  Department of Neurology  Movement Disorders

## 2020-11-09 NOTE — ASSESSMENT & PLAN NOTE
Orthostasis better, per patient. No longer syncopizes. However still has low Bps and tunnel vision at times.. Suggested wearing an abdominal binder and midodrine 5/7.5/5mg  TID. She knows not to lie down directly after midodrine. Suggested a pillbox for compliance.

## 2020-11-09 NOTE — ASSESSMENT & PLAN NOTE
Tremor-predominate PD. Some orthostasis which resolved.    Continue rasagiline 0.5mg as she feels this worsens orthostasis   Foot pedals - mild dyskinesias  Continue vvvxydu42/100/200 Q4H. Continue carbidopa 25mg Q3.5-4H to help decrease nausea.

## 2020-11-24 ENCOUNTER — PATIENT MESSAGE (OUTPATIENT)
Dept: NEUROLOGY | Facility: CLINIC | Age: 80
End: 2020-11-24

## 2020-11-24 ENCOUNTER — TELEPHONE (OUTPATIENT)
Dept: NEUROLOGY | Facility: CLINIC | Age: 80
End: 2020-11-24

## 2021-01-29 ENCOUNTER — OFFICE VISIT (OUTPATIENT)
Dept: NEUROLOGY | Facility: CLINIC | Age: 81
End: 2021-01-29
Payer: MEDICARE

## 2021-01-29 DIAGNOSIS — I95.1 ORTHOSTASIS: ICD-10-CM

## 2021-01-29 DIAGNOSIS — G20.A1 PARKINSON DISEASE: ICD-10-CM

## 2021-01-29 PROCEDURE — 99214 OFFICE O/P EST MOD 30 MIN: CPT | Mod: 95,,, | Performed by: PSYCHIATRY & NEUROLOGY

## 2021-01-29 PROCEDURE — 99214 PR OFFICE/OUTPT VISIT, EST, LEVL IV, 30-39 MIN: ICD-10-PCS | Mod: 95,,, | Performed by: PSYCHIATRY & NEUROLOGY

## 2021-01-29 RX ORDER — FLUDROCORTISONE ACETATE 0.1 MG/1
100 TABLET ORAL DAILY
Qty: 30 TABLET | Refills: 0 | Status: SHIPPED | OUTPATIENT
Start: 2021-01-29 | End: 2021-02-28

## 2021-03-03 ENCOUNTER — PATIENT MESSAGE (OUTPATIENT)
Dept: NEUROLOGY | Facility: CLINIC | Age: 81
End: 2021-03-03

## 2021-03-04 RX ORDER — FLUDROCORTISONE ACETATE 0.1 MG/1
100 TABLET ORAL DAILY
Qty: 30 TABLET | Refills: 11 | Status: SHIPPED | OUTPATIENT
Start: 2021-03-04 | End: 2021-04-03

## 2021-03-04 RX ORDER — FLUDROCORTISONE ACETATE 0.1 MG/1
100 TABLET ORAL DAILY
Qty: 30 TABLET | Refills: 11 | Status: SHIPPED | OUTPATIENT
Start: 2021-03-04 | End: 2021-03-04 | Stop reason: SDUPTHER

## 2021-09-03 ENCOUNTER — PATIENT MESSAGE (OUTPATIENT)
Dept: NEUROLOGY | Facility: CLINIC | Age: 81
End: 2021-09-03

## 2021-10-20 ENCOUNTER — TELEPHONE (OUTPATIENT)
Dept: NEUROLOGY | Facility: CLINIC | Age: 81
End: 2021-10-20

## 2021-10-26 ENCOUNTER — OFFICE VISIT (OUTPATIENT)
Dept: NEUROLOGY | Facility: CLINIC | Age: 81
End: 2021-10-26
Payer: MEDICARE

## 2021-10-26 VITALS — HEART RATE: 81 BPM | SYSTOLIC BLOOD PRESSURE: 88 MMHG | DIASTOLIC BLOOD PRESSURE: 54 MMHG

## 2021-10-26 DIAGNOSIS — R13.19 DYSPHAGIA, NEUROLOGIC: ICD-10-CM

## 2021-10-26 DIAGNOSIS — G20.A1 PARKINSON DISEASE: ICD-10-CM

## 2021-10-26 DIAGNOSIS — F41.9 ANXIETY: ICD-10-CM

## 2021-10-26 DIAGNOSIS — R13.10 DYSPHAGIA, UNSPECIFIED TYPE: ICD-10-CM

## 2021-10-26 DIAGNOSIS — I95.1 ORTHOSTASIS: Primary | ICD-10-CM

## 2021-10-26 DIAGNOSIS — R41.3 MEMORY LOSS: ICD-10-CM

## 2021-10-26 PROCEDURE — 99215 PR OFFICE/OUTPT VISIT, EST, LEVL V, 40-54 MIN: ICD-10-PCS | Mod: S$PBB,,, | Performed by: PSYCHIATRY & NEUROLOGY

## 2021-10-26 PROCEDURE — 99999 PR PBB SHADOW E&M-EST. PATIENT-LVL III: ICD-10-PCS | Mod: PBBFAC,,, | Performed by: PSYCHIATRY & NEUROLOGY

## 2021-10-26 PROCEDURE — 99999 PR PBB SHADOW E&M-EST. PATIENT-LVL III: CPT | Mod: PBBFAC,,, | Performed by: PSYCHIATRY & NEUROLOGY

## 2021-10-26 PROCEDURE — 99213 OFFICE O/P EST LOW 20 MIN: CPT | Mod: PBBFAC | Performed by: PSYCHIATRY & NEUROLOGY

## 2021-10-26 PROCEDURE — 99215 OFFICE O/P EST HI 40 MIN: CPT | Mod: S$PBB,,, | Performed by: PSYCHIATRY & NEUROLOGY

## 2021-10-26 RX ORDER — FLUDROCORTISONE ACETATE 0.1 MG/1
200 TABLET ORAL DAILY
Qty: 60 TABLET | Refills: 0 | Status: SHIPPED | OUTPATIENT
Start: 2021-10-26 | End: 2021-11-25

## 2021-10-26 RX ORDER — MIDODRINE HYDROCHLORIDE 2.5 MG/1
2.5 TABLET ORAL DAILY
Qty: 30 TABLET | Refills: 11 | Status: SHIPPED | OUTPATIENT
Start: 2021-10-26 | End: 2022-09-19

## 2021-10-26 RX ORDER — SERTRALINE HYDROCHLORIDE 25 MG/1
75 TABLET, FILM COATED ORAL DAILY
Qty: 90 TABLET | Refills: 11 | Status: SHIPPED | OUTPATIENT
Start: 2021-10-26 | End: 2022-10-26

## 2021-10-27 ENCOUNTER — TELEPHONE (OUTPATIENT)
Dept: NEUROLOGY | Facility: CLINIC | Age: 81
End: 2021-10-27
Payer: MEDICARE

## 2021-10-28 ENCOUNTER — TELEPHONE (OUTPATIENT)
Dept: SPEECH THERAPY | Facility: HOSPITAL | Age: 81
End: 2021-10-28
Payer: MEDICARE

## 2021-11-09 ENCOUNTER — TELEPHONE (OUTPATIENT)
Dept: SPEECH THERAPY | Facility: HOSPITAL | Age: 81
End: 2021-11-09
Payer: MEDICARE

## 2021-12-03 ENCOUNTER — HOSPITAL ENCOUNTER (OUTPATIENT)
Dept: RADIOLOGY | Facility: HOSPITAL | Age: 81
Discharge: HOME | End: 2021-12-03
Attending: PSYCHIATRY & NEUROLOGY
Payer: MEDICARE

## 2021-12-03 ENCOUNTER — CLINICAL SUPPORT (OUTPATIENT)
Dept: SPEECH THERAPY | Facility: HOSPITAL | Age: 81
End: 2021-12-03
Attending: PSYCHIATRY & NEUROLOGY
Payer: MEDICARE

## 2021-12-03 DIAGNOSIS — R13.10 DYSPHAGIA, UNSPECIFIED TYPE: ICD-10-CM

## 2021-12-03 DIAGNOSIS — G20.A1 PARKINSON DISEASE: ICD-10-CM

## 2021-12-03 PROCEDURE — 74230 X-RAY XM SWLNG FUNCJ C+: CPT | Mod: 26,,, | Performed by: RADIOLOGY

## 2021-12-03 PROCEDURE — 92611 MOTION FLUOROSCOPY/SWALLOW: CPT | Mod: GN

## 2021-12-03 PROCEDURE — 74230 X-RAY XM SWLNG FUNCJ C+: CPT | Mod: TC

## 2021-12-03 PROCEDURE — 25500020 PHARM REV CODE 255: Performed by: PSYCHIATRY & NEUROLOGY

## 2021-12-03 PROCEDURE — A9698 NON-RAD CONTRAST MATERIALNOC: HCPCS | Performed by: PSYCHIATRY & NEUROLOGY

## 2021-12-03 PROCEDURE — 74230 FL MODIFIED BARIUM SWALLOW SPEECH STUDY: ICD-10-PCS | Mod: 26,,, | Performed by: RADIOLOGY

## 2021-12-03 RX ADMIN — BARIUM SULFATE 40 ML: 0.81 POWDER, FOR SUSPENSION ORAL at 02:12

## 2021-12-06 ENCOUNTER — OFFICE VISIT (OUTPATIENT)
Dept: CARDIOLOGY | Facility: CLINIC | Age: 81
End: 2021-12-06
Payer: MEDICARE

## 2021-12-06 VITALS
WEIGHT: 97.13 LBS | SYSTOLIC BLOOD PRESSURE: 128 MMHG | DIASTOLIC BLOOD PRESSURE: 80 MMHG | HEART RATE: 59 BPM | BODY MASS INDEX: 17.2 KG/M2

## 2021-12-06 DIAGNOSIS — I95.1 ORTHOSTASIS: ICD-10-CM

## 2021-12-06 DIAGNOSIS — I95.1 ORTHOSTASIS: Primary | ICD-10-CM

## 2021-12-06 DIAGNOSIS — G20.A1 PARKINSON DISEASE: Primary | ICD-10-CM

## 2021-12-06 PROCEDURE — 99204 PR OFFICE/OUTPT VISIT, NEW, LEVL IV, 45-59 MIN: ICD-10-PCS | Mod: S$PBB,,, | Performed by: INTERNAL MEDICINE

## 2021-12-06 PROCEDURE — 93005 ELECTROCARDIOGRAM TRACING: CPT | Mod: PO

## 2021-12-06 PROCEDURE — 99999 PR PBB SHADOW E&M-EST. PATIENT-LVL IV: CPT | Mod: PBBFAC,,, | Performed by: INTERNAL MEDICINE

## 2021-12-06 PROCEDURE — 93010 ELECTROCARDIOGRAM REPORT: CPT | Mod: ,,, | Performed by: INTERNAL MEDICINE

## 2021-12-06 PROCEDURE — 99214 OFFICE O/P EST MOD 30 MIN: CPT | Mod: PBBFAC,PO | Performed by: INTERNAL MEDICINE

## 2021-12-06 PROCEDURE — 99204 OFFICE O/P NEW MOD 45 MIN: CPT | Mod: S$PBB,,, | Performed by: INTERNAL MEDICINE

## 2021-12-06 PROCEDURE — 93010 EKG 12-LEAD: ICD-10-PCS | Mod: ,,, | Performed by: INTERNAL MEDICINE

## 2021-12-06 PROCEDURE — 99999 PR PBB SHADOW E&M-EST. PATIENT-LVL IV: ICD-10-PCS | Mod: PBBFAC,,, | Performed by: INTERNAL MEDICINE

## 2021-12-06 RX ORDER — FLUDROCORTISONE ACETATE 0.1 MG/1
0.1 TABLET ORAL
COMMUNITY
Start: 2021-02-02 | End: 2022-03-09

## 2022-01-03 ENCOUNTER — HOSPITAL ENCOUNTER (OUTPATIENT)
Dept: CARDIOLOGY | Facility: HOSPITAL | Age: 82
Discharge: HOME | End: 2022-01-03
Attending: INTERNAL MEDICINE
Payer: MEDICARE

## 2022-01-03 VITALS
WEIGHT: 97 LBS | DIASTOLIC BLOOD PRESSURE: 80 MMHG | SYSTOLIC BLOOD PRESSURE: 128 MMHG | HEART RATE: 57 BPM | HEIGHT: 63 IN | BODY MASS INDEX: 17.19 KG/M2

## 2022-01-03 DIAGNOSIS — I95.1 ORTHOSTASIS: ICD-10-CM

## 2022-01-03 PROCEDURE — 93306 ECHO (CUPID ONLY): ICD-10-PCS | Mod: 26,,, | Performed by: INTERNAL MEDICINE

## 2022-01-03 PROCEDURE — 93227 XTRNL ECG REC<48 HR R&I: CPT | Mod: ,,, | Performed by: INTERNAL MEDICINE

## 2022-01-03 PROCEDURE — 93227 HOLTER MONITOR - 48 HOUR (CUPID ONLY): ICD-10-PCS | Mod: ,,, | Performed by: INTERNAL MEDICINE

## 2022-01-03 PROCEDURE — 93226 XTRNL ECG REC<48 HR SCAN A/R: CPT | Mod: PO

## 2022-01-03 PROCEDURE — 93306 TTE W/DOPPLER COMPLETE: CPT | Mod: 26,,, | Performed by: INTERNAL MEDICINE

## 2022-01-03 PROCEDURE — 93306 TTE W/DOPPLER COMPLETE: CPT | Mod: PO

## 2022-01-05 ENCOUNTER — TELEPHONE (OUTPATIENT)
Dept: CARDIOLOGY | Facility: HOSPITAL | Age: 82
End: 2022-01-05
Payer: MEDICARE

## 2022-01-05 LAB
AORTIC ROOT ANNULUS: 3.11 CM
AV INDEX (PROSTH): 0.34
AV MEAN GRADIENT: 19 MMHG
AV PEAK GRADIENT: 32 MMHG
AV REGURGITATION PRESSURE HALF TIME: 522.64 MS
AV VALVE AREA: 1.06 CM2
AV VELOCITY RATIO: 0.37
BSA FOR ECHO PROCEDURE: 1.4 M2
CV ECHO LV RWT: 0.5 CM
DOP CALC AO PEAK VEL: 2.84 M/S
DOP CALC AO VTI: 68.7 CM
DOP CALC LVOT AREA: 3.1 CM2
DOP CALC LVOT DIAMETER: 1.98 CM
DOP CALC LVOT PEAK VEL: 1.04 M/S
DOP CALC LVOT STROKE VOLUME: 72.63 CM3
DOP CALCLVOT PEAK VEL VTI: 23.6 CM
E WAVE DECELERATION TIME: 118.91 MSEC
E/A RATIO: 1.09
E/E' RATIO: 16.31 M/S
ECHO EF ESTIMATED: 57 %
ECHO LV POSTERIOR WALL: 1.03 CM (ref 0.6–1.1)
EJECTION FRACTION: 60 %
FRACTIONAL SHORTENING: 22 % (ref 28–44)
HCV RNA # SERPL NAA+PROBE: 599.4 MMHG/S
INTERVENTRICULAR SEPTUM: 1.05 CM (ref 0.6–1.1)
IVRT: 122.74 MSEC
LA MAJOR: 5.61 CM
LA MINOR: 5.54 CM
LA WIDTH: 3.67 CM
LEFT ATRIUM SIZE: 3.98 CM
LEFT ATRIUM VOLUME INDEX MOD: 23.7 ML/M2
LEFT ATRIUM VOLUME INDEX: 48.7 ML/M2
LEFT ATRIUM VOLUME MOD: 33.6 CM3
LEFT ATRIUM VOLUME: 69.21 CM3
LEFT INTERNAL DIMENSION IN SYSTOLE: 3.18 CM (ref 2.1–4)
LEFT VENTRICLE DIASTOLIC VOLUME INDEX: 51.54 ML/M2
LEFT VENTRICLE DIASTOLIC VOLUME: 73.19 ML
LEFT VENTRICLE MASS INDEX: 98 G/M2
LEFT VENTRICLE SYSTOLIC VOLUME INDEX: 22.4 ML/M2
LEFT VENTRICLE SYSTOLIC VOLUME: 31.77 ML
LEFT VENTRICULAR INTERNAL DIMENSION IN DIASTOLE: 4.08 CM (ref 3.5–6)
LEFT VENTRICULAR MASS: 138.57 G
LV LATERAL E/E' RATIO: 13.25 M/S
LV SEPTAL E/E' RATIO: 21.2 M/S
LVOT MG: 2.26 MMHG
LVOT MV: 0.71 CM/S
MV PEAK A VEL: 0.97 M/S
MV PEAK E VEL: 1.06 M/S
MV STENOSIS PRESSURE HALF TIME: 34.48 MS
MV VALVE AREA P 1/2 METHOD: 6.38 CM2
PISA AR MAX VEL: 5.16 M/S
PISA TR MAX VEL: 2.88 M/S
PULM VEIN S/D RATIO: 1.86
PV PEAK D VEL: 0.41 M/S
PV PEAK S VEL: 0.76 M/S
RA MAJOR: 4.7 CM
RA PRESSURE: 8 MMHG
RA WIDTH: 3.24 CM
RIGHT VENTRICULAR END-DIASTOLIC DIMENSION: 2.72 CM
SINUS: 2.68 CM
STJ: 2.67 CM
TDI LATERAL: 0.08 M/S
TDI SEPTAL: 0.05 M/S
TDI: 0.07 M/S
TR MAX PG: 33 MMHG
TRICUSPID ANNULAR PLANE SYSTOLIC EXCURSION: 2.7 CM
TV REST PULMONARY ARTERY PRESSURE: 41 MMHG

## 2022-01-05 NOTE — TELEPHONE ENCOUNTER
Spoke with patient and caregiver.Will be dropping off Holter tomorrow before noon.Patient lives 2 hours from office and will have someone drop it off for her.

## 2022-01-07 ENCOUNTER — TELEPHONE (OUTPATIENT)
Dept: CARDIOLOGY | Facility: CLINIC | Age: 82
End: 2022-01-07
Payer: MEDICARE

## 2022-01-07 LAB
OHS CV EVENT MONITOR DAY: 2
OHS CV HOLTER LENGTH DECIMAL HOURS: 96
OHS CV HOLTER LENGTH HOURS: 48
OHS CV HOLTER LENGTH MINUTES: 0
OHS CV HOLTER SINUS AVERAGE HR: 65
OHS CV HOLTER SINUS MAX HR: 90
OHS CV HOLTER SINUS MIN HR: 42

## 2022-02-21 ENCOUNTER — PATIENT MESSAGE (OUTPATIENT)
Dept: NEUROLOGY | Facility: CLINIC | Age: 82
End: 2022-02-21
Payer: MEDICARE

## 2022-12-16 ENCOUNTER — PATIENT MESSAGE (OUTPATIENT)
Dept: NEUROLOGY | Facility: CLINIC | Age: 82
End: 2022-12-16
Payer: MEDICARE

## 2023-05-01 DIAGNOSIS — R41.3 MEMORY LOSS: ICD-10-CM

## 2023-05-01 RX ORDER — MEMANTINE HYDROCHLORIDE 10 MG/1
TABLET ORAL
Qty: 60 TABLET | Refills: 2 | Status: SHIPPED | OUTPATIENT
Start: 2023-05-01

## 2023-06-12 ENCOUNTER — PATIENT MESSAGE (OUTPATIENT)
Dept: NEUROLOGY | Facility: CLINIC | Age: 83
End: 2023-06-12
Payer: MEDICARE

## 2024-02-07 ENCOUNTER — PATIENT MESSAGE (OUTPATIENT)
Dept: RESEARCH | Facility: HOSPITAL | Age: 84
End: 2024-02-07
Payer: MEDICARE